# Patient Record
Sex: FEMALE | Race: BLACK OR AFRICAN AMERICAN | NOT HISPANIC OR LATINO | Employment: PART TIME | URBAN - METROPOLITAN AREA
[De-identification: names, ages, dates, MRNs, and addresses within clinical notes are randomized per-mention and may not be internally consistent; named-entity substitution may affect disease eponyms.]

---

## 2020-06-19 ENCOUNTER — TRANSCRIBE ORDERS (OUTPATIENT)
Dept: ADMINISTRATIVE | Age: 30
End: 2020-06-19

## 2020-06-19 ENCOUNTER — APPOINTMENT (OUTPATIENT)
Dept: RADIOLOGY | Age: 30
End: 2020-06-19

## 2020-06-19 DIAGNOSIS — Z02.1 PRE-EMPLOYMENT HEALTH SCREENING EXAMINATION: Primary | ICD-10-CM

## 2020-06-19 DIAGNOSIS — Z02.1 PRE-EMPLOYMENT HEALTH SCREENING EXAMINATION: ICD-10-CM

## 2020-06-19 PROCEDURE — 71045 X-RAY EXAM CHEST 1 VIEW: CPT

## 2021-01-12 ENCOUNTER — HOSPITAL ENCOUNTER (EMERGENCY)
Facility: HOSPITAL | Age: 31
Discharge: HOME | End: 2021-01-12
Attending: EMERGENCY MEDICINE

## 2021-01-12 VITALS
HEART RATE: 79 BPM | WEIGHT: 202 LBS | RESPIRATION RATE: 18 BRPM | SYSTOLIC BLOOD PRESSURE: 126 MMHG | TEMPERATURE: 97.7 F | DIASTOLIC BLOOD PRESSURE: 78 MMHG | OXYGEN SATURATION: 100 %

## 2021-01-12 DIAGNOSIS — N92.1 MENOMETRORRHAGIA: Primary | ICD-10-CM

## 2021-01-12 LAB
ALBUMIN SERPL BCP-MCNC: 3.9 G/DL (ref 3.5–5)
ALP SERPL-CCNC: 107 U/L (ref 46–116)
ALT SERPL W P-5'-P-CCNC: 14 U/L (ref 12–78)
ANION GAP SERPL CALCULATED.3IONS-SCNC: 8 MMOL/L (ref 4–13)
AST SERPL W P-5'-P-CCNC: 12 U/L (ref 5–45)
B-HCG SERPL-ACNC: <2 MIU/ML
BACTERIA UR QL AUTO: ABNORMAL /HPF
BASOPHILS # BLD AUTO: 0.03 THOUSANDS/ΜL (ref 0–0.1)
BASOPHILS NFR BLD AUTO: 1 % (ref 0–1)
BILIRUB SERPL-MCNC: 0.14 MG/DL (ref 0.2–1)
BILIRUB UR QL STRIP: NEGATIVE
BUN SERPL-MCNC: 12 MG/DL (ref 5–25)
CALCIUM SERPL-MCNC: 9.2 MG/DL (ref 8.3–10.1)
CHLORIDE SERPL-SCNC: 103 MMOL/L (ref 100–108)
CLARITY UR: CLEAR
CO2 SERPL-SCNC: 28 MMOL/L (ref 21–32)
COLOR UR: ABNORMAL
CREAT SERPL-MCNC: 0.86 MG/DL (ref 0.6–1.3)
EOSINOPHIL # BLD AUTO: 0.07 THOUSAND/ΜL (ref 0–0.61)
EOSINOPHIL NFR BLD AUTO: 1 % (ref 0–6)
ERYTHROCYTE [DISTWIDTH] IN BLOOD BY AUTOMATED COUNT: 15.7 % (ref 11.6–15.1)
EXT PREG TEST URINE: NEGATIVE
EXT. CONTROL ED NAV: NORMAL
GFR SERPL CREATININE-BSD FRML MDRD: 103 ML/MIN/1.73SQ M
GLUCOSE SERPL-MCNC: 86 MG/DL (ref 65–140)
GLUCOSE UR STRIP-MCNC: NEGATIVE MG/DL
HCT VFR BLD AUTO: 43.3 % (ref 34.8–46.1)
HGB BLD-MCNC: 13.8 G/DL (ref 11.5–15.4)
HGB UR QL STRIP.AUTO: ABNORMAL
HOLD SPECIMEN: NORMAL
IMM GRANULOCYTES # BLD AUTO: 0.01 THOUSAND/UL (ref 0–0.2)
IMM GRANULOCYTES NFR BLD AUTO: 0 % (ref 0–2)
KETONES UR STRIP-MCNC: NEGATIVE MG/DL
LEUKOCYTE ESTERASE UR QL STRIP: NEGATIVE
LYMPHOCYTES # BLD AUTO: 1.8 THOUSANDS/ΜL (ref 0.6–4.47)
LYMPHOCYTES NFR BLD AUTO: 29 % (ref 14–44)
MCH RBC QN AUTO: 26.7 PG (ref 26.8–34.3)
MCHC RBC AUTO-ENTMCNC: 31.9 G/DL (ref 31.4–37.4)
MCV RBC AUTO: 84 FL (ref 82–98)
MONOCYTES # BLD AUTO: 0.46 THOUSAND/ΜL (ref 0.17–1.22)
MONOCYTES NFR BLD AUTO: 7 % (ref 4–12)
NEUTROPHILS # BLD AUTO: 3.93 THOUSANDS/ΜL (ref 1.85–7.62)
NEUTS SEG NFR BLD AUTO: 62 % (ref 43–75)
NITRITE UR QL STRIP: NEGATIVE
NON-SQ EPI CELLS URNS QL MICRO: ABNORMAL /HPF
NRBC BLD AUTO-RTO: 0 /100 WBCS
PH UR STRIP.AUTO: 5.5 [PH] (ref 4.5–8)
PLATELET # BLD AUTO: 279 THOUSANDS/UL (ref 149–390)
PMV BLD AUTO: 10.7 FL (ref 8.9–12.7)
POTASSIUM SERPL-SCNC: 3.6 MMOL/L (ref 3.5–5.3)
PROT SERPL-MCNC: 8.1 G/DL (ref 6.4–8.2)
PROT UR STRIP-MCNC: NEGATIVE MG/DL
RBC # BLD AUTO: 5.17 MILLION/UL (ref 3.81–5.12)
RBC #/AREA URNS AUTO: ABNORMAL /HPF
SODIUM SERPL-SCNC: 139 MMOL/L (ref 136–145)
SP GR UR STRIP.AUTO: 1.02 (ref 1–1.03)
UROBILINOGEN UR QL STRIP.AUTO: 0.2 E.U./DL
WBC # BLD AUTO: 6.3 THOUSAND/UL (ref 4.31–10.16)
WBC #/AREA URNS AUTO: ABNORMAL /HPF

## 2021-01-12 PROCEDURE — 99283 EMERGENCY DEPT VISIT LOW MDM: CPT

## 2021-01-12 PROCEDURE — 99284 EMERGENCY DEPT VISIT MOD MDM: CPT | Performed by: EMERGENCY MEDICINE

## 2021-01-12 PROCEDURE — 87591 N.GONORRHOEAE DNA AMP PROB: CPT | Performed by: EMERGENCY MEDICINE

## 2021-01-12 PROCEDURE — 87491 CHLMYD TRACH DNA AMP PROBE: CPT | Performed by: EMERGENCY MEDICINE

## 2021-01-12 PROCEDURE — 84702 CHORIONIC GONADOTROPIN TEST: CPT | Performed by: EMERGENCY MEDICINE

## 2021-01-12 PROCEDURE — 36415 COLL VENOUS BLD VENIPUNCTURE: CPT

## 2021-01-12 PROCEDURE — 81001 URINALYSIS AUTO W/SCOPE: CPT

## 2021-01-12 PROCEDURE — 85025 COMPLETE CBC W/AUTO DIFF WBC: CPT | Performed by: EMERGENCY MEDICINE

## 2021-01-12 PROCEDURE — 81025 URINE PREGNANCY TEST: CPT | Performed by: EMERGENCY MEDICINE

## 2021-01-12 PROCEDURE — 80053 COMPREHEN METABOLIC PANEL: CPT | Performed by: EMERGENCY MEDICINE

## 2021-01-13 NOTE — ED PROVIDER NOTES
History  Chief Complaint   Patient presents with    Menstrual Problem     pt c/o menstrual cramps, heavy bleeding and "clots" for six days  68-year-old female comes in for evaluation of menstrual cramps and heavy bleeding for approximately 6 days  Patient states that she typically gets a normal cycle  without heavy bleeding and she has never had any clots before  Her last period December was normal   However approximately 6 days ago she started her cycle again and has had heavier bleeding than normal and noticed blood clots  Patient denies being pregnant however she is not on any birth control  Patient also has a history of 2 ectopic pregnancies  History provided by:  Patient   used: No    Vaginal Bleeding  Quality:  Clots and heavier than menses  Severity:  Moderate  Onset quality:  Gradual  Duration:  6 days  Timing:  Constant  Progression:  Worsening  Chronicity:  New  Menstrual history:  Regular  Possible pregnancy: Patient is unsure  Context: spontaneously    Ineffective treatments:  None tried  Associated symptoms: no abdominal pain, no back pain, no dysuria, no fatigue and no fever    Risk factors: hx of ectopic pregnancy and unprotected sex    Risk factors: no bleeding disorder, no ovarian torsion and no STD        None       History reviewed  No pertinent past medical history  History reviewed  No pertinent surgical history  History reviewed  No pertinent family history  I have reviewed and agree with the history as documented  E-Cigarette/Vaping     E-Cigarette/Vaping Substances     Social History     Tobacco Use    Smoking status: Never Smoker    Smokeless tobacco: Never Used   Substance Use Topics    Alcohol use: Never     Frequency: Never    Drug use: Never       Review of Systems   Constitutional: Negative for fatigue and fever  HENT: Negative for congestion and ear pain  Eyes: Negative for discharge and redness     Respiratory: Negative for apnea, cough, shortness of breath and wheezing  Cardiovascular: Negative for chest pain  Gastrointestinal: Negative for abdominal pain and diarrhea  Endocrine: Negative for cold intolerance and polydipsia  Genitourinary: Positive for vaginal bleeding  Negative for difficulty urinating, dysuria and hematuria  Musculoskeletal: Negative for arthralgias and back pain  Skin: Negative for color change and rash  Allergic/Immunologic: Negative for environmental allergies and immunocompromised state  Neurological: Negative for numbness and headaches  Hematological: Negative for adenopathy  Does not bruise/bleed easily  Psychiatric/Behavioral: Negative for agitation and behavioral problems  Physical Exam  Physical Exam  Vitals signs and nursing note reviewed  Constitutional:       Appearance: Normal appearance  She is well-developed  She is not toxic-appearing  HENT:      Head: Normocephalic and atraumatic  Right Ear: Tympanic membrane and external ear normal       Left Ear: Tympanic membrane and external ear normal       Nose: Nose normal  No nasal deformity or rhinorrhea  Mouth/Throat:      Dentition: Normal dentition  Pharynx: Uvula midline  Eyes:      General: Lids are normal          Right eye: No discharge  Left eye: No discharge  Conjunctiva/sclera: Conjunctivae normal       Pupils: Pupils are equal, round, and reactive to light  Neck:      Musculoskeletal: Normal range of motion and neck supple  Vascular: No carotid bruit or JVD  Trachea: Trachea normal    Cardiovascular:      Rate and Rhythm: Normal rate and regular rhythm  No extrasystoles are present  Chest Wall: PMI is not displaced  Pulses: Normal pulses  Pulmonary:      Effort: Pulmonary effort is normal  No accessory muscle usage or respiratory distress  Breath sounds: Normal breath sounds  No wheezing, rhonchi or rales     Abdominal:      General: Bowel sounds are normal  Palpations: Abdomen is soft  Abdomen is not rigid  There is no mass  Tenderness: There is no abdominal tenderness  There is no guarding or rebound  Musculoskeletal:      Right shoulder: She exhibits normal range of motion, no bony tenderness, no swelling and no deformity  Cervical back: Normal  She exhibits normal range of motion, no tenderness, no bony tenderness and no deformity  Lymphadenopathy:      Cervical: No cervical adenopathy  Skin:     General: Skin is warm and dry  Findings: No rash  Neurological:      Mental Status: She is alert and oriented to person, place, and time  GCS: GCS eye subscore is 4  GCS verbal subscore is 5  GCS motor subscore is 6  Cranial Nerves: No cranial nerve deficit  Sensory: No sensory deficit  Deep Tendon Reflexes: Reflexes are normal and symmetric     Psychiatric:         Speech: Speech normal          Behavior: Behavior normal          Vital Signs  ED Triage Vitals   Temperature Pulse Respirations Blood Pressure SpO2   01/12/21 1704 01/12/21 1704 01/12/21 1704 01/12/21 1704 01/12/21 1948   97 7 °F (36 5 °C) 95 18 134/58 100 %      Temp Source Heart Rate Source Patient Position - Orthostatic VS BP Location FiO2 (%)   01/12/21 1704 01/12/21 1704 01/12/21 1704 01/12/21 1704 --   Oral Monitor Sitting Right arm       Pain Score       --                  Vitals:    01/12/21 1704 01/12/21 1948   BP: 134/58 126/78   Pulse: 95 79   Patient Position - Orthostatic VS: Sitting Lying         Visual Acuity      ED Medications  Medications - No data to display    Diagnostic Studies  Results Reviewed     Procedure Component Value Units Date/Time    hCG, quantitative [[id number]]  (Normal) Collected: 01/12/21 1707    Lab Status: Final result Specimen: Blood from Arm, Right Updated: 01/12/21 1947     HCG, Quant <2 mIU/mL     Narrative:       Expected Ranges:     Approximate               Approximate HCG  Gestation age          Concentration ( mIU/mL)  _____________          ______________________   Maddie Sommers                      HCG values  0 2-1                       5-50  1-2                           2-3                         100-5000  3-4                         500-13515  4-5                         1000-20605  5-6                         [id number]  6-8                         [id number]  8-12                        12186-875326      Chlamydia/GC amplified DNA by PCR [[id number]] Collected: 01/12/21 1944    Lab Status:  In process Updated: 01/12/21 1944    Urine Microscopic [[id number]]  (Abnormal) Collected: 01/12/21 1918    Lab Status: Final result Specimen: Urine, Clean Catch Updated: 01/12/21 1939     RBC, UA 30-50 /hpf      WBC, UA None Seen /hpf      Epithelial Cells Occasional /hpf      Bacteria, UA Occasional /hpf     POCT pregnancy, urine [[id number]]  (Normal) Resulted: 01/12/21 1922    Lab Status: Final result Updated: 01/12/21 1922     EXT PREG TEST UR (Ref: Negative) negative     Control valid    Urine Macroscopic, POC [[id number]]  (Abnormal) Collected: 01/12/21 1918    Lab Status: Final result Specimen: Urine Updated: 01/12/21 1920     Color, UA Marlena     Clarity, UA Clear     pH, UA 5 5     Leukocytes, UA Negative     Nitrite, UA Negative     Protein, UA Negative mg/dl      Glucose, UA Negative mg/dl      Ketones, UA Negative mg/dl      Urobilinogen, UA 0 2 E U /dl      Bilirubin, UA Negative     Blood, UA Large     Specific Gravity, UA 1 025    Narrative:      CLINITEK RESULT    Comprehensive metabolic panel [[id number]]  (Abnormal) Collected: 01/12/21 1707    Lab Status: Final result Specimen: Blood from Arm, Right Updated: 01/12/21 1732     Sodium 139 mmol/L      Potassium 3 6 mmol/L      Chloride 103 mmol/L      CO2 28 mmol/L      ANION GAP 8 mmol/L      BUN 12 mg/dL      Creatinine 0 86 mg/dL      Glucose 86 mg/dL      Calcium 9 2 mg/dL      AST 12 U/L      ALT 14 U/L      Alkaline Phosphatase 107 U/L      Total Protein 8 1 g/dL      Albumin 3 9 g/dL      Total Bilirubin 0 14 mg/dL      eGFR 103 ml/min/1 73sq m     Narrative:      [place] guidelines for Chronic Kidney Disease (CKD):     Stage 1 with normal or high GFR (GFR > 90 mL/min/1 73 square meters)    Stage 2 Mild CKD (GFR = 60-89 mL/min/1 73 square meters)    Stage 3A Moderate CKD (GFR = 45-59 mL/min/1 73 square meters)    Stage 3B Moderate CKD (GFR = 30-44 mL/min/1 73 square meters)    Stage 4 Severe CKD (GFR = 15-29 mL/min/1 73 square meters)    Stage 5 End Stage CKD (GFR <15 mL/min/1 73 square meters)  Note: GFR calculation is accurate only with a steady state creatinine    CBC and differential [[id number]]  (Abnormal) Collected: 01/12/21 1707    Lab Status: Final result Specimen: Blood from Arm, Right Updated: 01/12/21 1715     WBC 6 30 Thousand/uL      RBC 5 17 Million/uL      Hemoglobin 13 8 g/dL      Hematocrit 43 3 %      MCV 84 fL      MCH 26 7 pg      MCHC 31 9 g/dL      RDW 15 7 %      MPV 10 7 fL      Platelets 850 Thousands/uL      nRBC 0 /100 WBCs      Neutrophils Relative 62 %      Immat GRANS % 0 %      Lymphocytes Relative 29 %      Monocytes Relative 7 %      Eosinophils Relative 1 %      Basophils Relative 1 %      Neutrophils Absolute 3 93 Thousands/µL      Immature Grans Absolute 0 01 Thousand/uL      Lymphocytes Absolute 1 80 Thousands/µL      Monocytes Absolute 0 46 Thousand/µL      Eosinophils Absolute 0 07 Thousand/µL      Basophils Absolute 0 03 Thousands/µL                  No orders to display              Procedures  Procedures         ED Course  ED Course as of Jan 12 2010 Tue Jan 12, 2021   1932 Discussed with patient that which she will need further workup with OB gyn including possibly different blood work and a pelvic ultrasound  This department does not do pelvic ultrasounds on stable non pregnant patients as per radiology policy  Patient has a normal hemoglobin and blood pressure    This would not be considered unstable  SBIRT 22yo+      Most Recent Value   SBIRT (24 yo +)   In order to provide better care to our patients, we are screening all of our patients for alcohol and drug use  Would it be okay to ask you these screening questions? Yes Filed at: 01/12/2021 1910   Initial Alcohol Screen: US AUDIT-C    1  How often do you have a drink containing alcohol?  0 Filed at: 01/12/2021 1910   2  How many drinks containing alcohol do you have on a typical day you are drinking? 0 Filed at: 01/12/2021 1910   3a  Male UNDER 65: How often do you have five or more drinks on one occasion? 0 Filed at: 01/12/2021 1910   3b  FEMALE Any Age, or MALE 65+: How often do you have 4 or more drinks on one occassion? 0 Filed at: 01/12/2021 1910   Audit-C Score  0 Filed at: 01/12/2021 1910   ESTHER: How many times in the past year have you    Used an illegal drug or used a prescription medication for non-medical reasons? Never Filed at: 01/12/2021 1910                    MDM  Number of Diagnoses or Management Options  Menometrorrhagia: new and requires workup     Amount and/or Complexity of Data Reviewed  Clinical lab tests: ordered and reviewed  Tests in the medicine section of CPT®: ordered and reviewed    Patient Progress  Patient progress: stable      Disposition  Final diagnoses:   Menometrorrhagia     Time reflects when diagnosis was documented in both MDM as applicable and the Disposition within this note     Time User Action Codes Description Comment    1/12/2021  7:30 PM Leslie Hutchison Add [N92 1] Menometrorrhagia       ED Disposition     ED Disposition Condition Date/Time Comment    Discharge Stable Tue Jan 12, 2021  7:30  [address] discharge to home/self care              Follow-up Information     Follow up With Specialties Details Why Contact Info Kala Sanders [facility] Schedule an appointment as soon as possible for a visit  Please call here or with your regular OB gyn will need further workup [address] [id number]  [address], [place], [place], [address] [phone]          There are no discharge medications for this patient  No discharge procedures on file      PDMP Review     None          ED Provider  Electronically Signed by           Marcella Gatica DO  01/12/21 2010       Marcella Gatica DO  01/12/21 2010

## 2021-01-13 NOTE — ED NOTES
Pt up and walking to the bathroom to provide a urine sample   No difficulties or distress noted at this time     Ciera Eller RN  01/12/21 8951

## 2021-01-17 LAB
C TRACH DNA SPEC QL NAA+PROBE: NEGATIVE
N GONORRHOEA DNA SPEC QL NAA+PROBE: NEGATIVE

## 2021-01-22 ENCOUNTER — OFFICE VISIT (OUTPATIENT)
Dept: OBGYN CLINIC | Facility: CLINIC | Age: 31
End: 2021-01-22

## 2021-01-22 VITALS
WEIGHT: 203 LBS | DIASTOLIC BLOOD PRESSURE: 65 MMHG | SYSTOLIC BLOOD PRESSURE: 102 MMHG | HEART RATE: 94 BPM | BODY MASS INDEX: 33.82 KG/M2 | HEIGHT: 65 IN

## 2021-01-22 DIAGNOSIS — N93.9 ABNORMAL UTERINE BLEEDING: Primary | ICD-10-CM

## 2021-01-22 PROCEDURE — 99213 OFFICE O/P EST LOW 20 MIN: CPT | Performed by: OBSTETRICS & GYNECOLOGY

## 2021-01-22 NOTE — PROGRESS NOTES
Assessment/Plan:     Diagnoses and all orders for this visit:    Abnormal uterine bleeding  -     TSH, 3rd generation with Free T4 reflex; Future  -     CBC and differential; Future  -     US pelvis complete w transvaginal; Future    Patient is a well appearing 26year old woman with new onset abnormal uterine bleeding  High likelihood of fibroids  Will check Pelvic U/S  Will also check CBC and TSH  Began discussing birth control options with patient and she is leaning towards Mirena IUD  Return in about 1 week (around 1/29/2021) for Review US and lab results   The patient indicates understanding of these issues and agrees with the plan  Subjective:      Patient ID: Jaziel Leon is a 26 y o  female  HPI   Patient who is a [id number] with a past medical history of 2 ectopic pregnancies in 2013 and 2016 reports that since January 6th, 2021 she has been experiencing heavy menstrual bleeding  Patient states that for roughly the first 10 days she was passing golf-ball sized blood clots and had to wear an adult diaper  She reports that over the past 3 days the bleeding has slowed down, but she continues to pass some quarter-size clots  Patient was seen in the [facility] ED on January 12th and lab work was done showing patient was not pregnant or anemic  Imaging studies were not done at this time  Patient reports that prior to this episode, her periods were normal  She states they had been coming every 28 to 30 days since the age of 13 and and lasted 4 days  Patient reports she has been experiencing mild, intermittent abdominal cramping over the past 2 weeks and also reports intermittent dizziness and lightheadedness with standing  Patient states she was on Depo-Provera a few years ago for birth control, but stopped this due to unwanted weight gain     Of note, patient's older sister who is 45 was recently diagnosed with Ovarian cancer     The following portions of the patient's history were reviewed and updated as appropriate: allergies, current medications, past family history, past medical history, past social history, past surgical history and problem list     Review of Systems      Objective:  /65 (BP Location: Right arm, Patient Position: Sitting, Cuff Size: Adult)   Pulse 94   Ht 5' 5" (1 651 m)   Wt 92 1 kg (203 lb)   LMP 01/06/2021   BMI 33 78 kg/m²        Physical Exam  Vitals signs reviewed  Constitutional:       General: She is not in acute distress  Appearance: Normal appearance  She is obese  She is not ill-appearing, toxic-appearing or diaphoretic  HENT:      Head: Normocephalic and atraumatic  Right Ear: External ear normal       Left Ear: External ear normal    Eyes:      General: No scleral icterus  Right eye: No discharge  Left eye: No discharge  Extraocular Movements: Extraocular movements intact  Conjunctiva/sclera: Conjunctivae normal    Neck:      Musculoskeletal: Normal range of motion and neck supple  Cardiovascular:      Rate and Rhythm: Normal rate and regular rhythm  Pulses: Normal pulses  Heart sounds: Normal heart sounds  No murmur  No friction rub  No gallop  Pulmonary:      Effort: Pulmonary effort is normal  No respiratory distress  Breath sounds: Normal breath sounds  No wheezing, rhonchi or rales  Abdominal:      General: Bowel sounds are normal  There is no distension  Palpations: Abdomen is soft  There is no mass  Tenderness: There is no abdominal tenderness  Skin:     General: Skin is warm and dry  Capillary Refill: Capillary refill takes less than 2 seconds  Neurological:      General: No focal deficit present  Mental Status: She is alert and oriented to person, place, and time     Psychiatric:         Mood and Affect: Mood normal          Behavior: Behavior normal

## 2021-06-24 ENCOUNTER — APPOINTMENT (OUTPATIENT)
Dept: RADIOLOGY | Facility: CLINIC | Age: 31
End: 2021-06-24

## 2021-06-24 ENCOUNTER — APPOINTMENT (OUTPATIENT)
Dept: URGENT CARE | Facility: CLINIC | Age: 31
End: 2021-06-24

## 2021-06-24 DIAGNOSIS — A15.0 TB (PULMONARY TUBERCULOSIS): ICD-10-CM

## 2021-06-24 DIAGNOSIS — A15.0 TB (PULMONARY TUBERCULOSIS): Primary | ICD-10-CM

## 2021-06-24 PROCEDURE — 71046 X-RAY EXAM CHEST 2 VIEWS: CPT

## 2021-09-08 ENCOUNTER — TELEPHONE (OUTPATIENT)
Dept: FAMILY MEDICINE CLINIC | Facility: CLINIC | Age: 31
End: 2021-09-08

## 2021-09-09 ENCOUNTER — OFFICE VISIT (OUTPATIENT)
Dept: FAMILY MEDICINE CLINIC | Facility: CLINIC | Age: 31
End: 2021-09-09

## 2021-09-09 VITALS
SYSTOLIC BLOOD PRESSURE: 110 MMHG | TEMPERATURE: 97.4 F | RESPIRATION RATE: 18 BRPM | DIASTOLIC BLOOD PRESSURE: 70 MMHG | HEART RATE: 90 BPM | OXYGEN SATURATION: 99 % | WEIGHT: 204 LBS | HEIGHT: 65 IN | BODY MASS INDEX: 33.99 KG/M2

## 2021-09-09 DIAGNOSIS — Z11.59 ENCOUNTER FOR HEPATITIS C SCREENING TEST FOR LOW RISK PATIENT: ICD-10-CM

## 2021-09-09 DIAGNOSIS — Z13.6 SCREENING FOR CARDIOVASCULAR CONDITION: ICD-10-CM

## 2021-09-09 DIAGNOSIS — Z00.00 ANNUAL PHYSICAL EXAM: Primary | ICD-10-CM

## 2021-09-09 DIAGNOSIS — Z11.4 SCREENING FOR HIV (HUMAN IMMUNODEFICIENCY VIRUS): ICD-10-CM

## 2021-09-09 PROCEDURE — 99395 PREV VISIT EST AGE 18-39: CPT | Performed by: FAMILY MEDICINE

## 2021-09-09 NOTE — ASSESSMENT & PLAN NOTE
- H/o ectopic pregnancy X2, menorrhagia  - Screening for cardiovascular disease: yearly BMP and Lipid panel ordered today   - Screening for colorectal cancer: no fam h/o   - Screening for cervical cancer: no fam h/o; last pap reported in 2019 which patient reports was normal  - H/o uterine cancer in sister diagnosed at age 45 approximately  - Screening for breast cancer: no family history  - Diabetic grandmother, no MI in family or strokes  - Vaccinations: unsure if UTD on Tdap, last pregnancy 7 years ago  - H/o BCG vaccine as a child, routine CXR screening done in June 2021 and read as normal  - Depression screening: negative 0   - Counseled the patient on healthy lifestyle habits

## 2021-09-09 NOTE — PATIENT INSTRUCTIONS

## 2021-09-09 NOTE — PROGRESS NOTES
[address] [facility]    NAME: Jamari Crenshaw  AGE: 33 y o  SEX: female  : 1990     DATE: 2021     Assessment and Plan:     Problem List Items Addressed This Visit        Other    Annual physical exam - Primary     - H/o ectopic pregnancy X2, menorrhagia  - Screening for cardiovascular disease: yearly BMP and Lipid panel ordered today   - Screening for colorectal cancer: no fam h/o   - Screening for cervical cancer: no fam h/o; last pap reported in 2019 which patient reports was normal  - H/o uterine cancer in sister diagnosed at age 45 approximately  - Screening for breast cancer: no family history  - Diabetic grandmother, no MI in family or strokes  - Vaccinations: unsure if UTD on Tdap, last pregnancy 7 years ago  - H/o BCG vaccine as a child, routine CXR screening done in 2021 and read as normal  - Depression screening: negative 0   - Counseled the patient on healthy lifestyle habits             Other Visit Diagnoses     Encounter for hepatitis C screening test for low risk patient        Relevant Orders    Hepatitis C antibody    Screening for HIV (human immunodeficiency virus)        Relevant Orders    HIV 1/2 Antigen/Antibody (4th Generation) w Reflex [facility]    Screening for cardiovascular condition        Relevant Orders    Lipid panel    BMI 33 0-33 9,adult              Immunizations and preventive care screenings were discussed with patient today  Appropriate education was printed on patient's after visit summary  Counseling:  Alcohol/drug use: discussed moderation in alcohol intake, the recommendations for healthy alcohol use, and avoidance of illicit drug use  Dental Health: discussed importance of regular tooth brushing, flossing, and dental visits    Injury prevention: discussed safety/seat belts, safety helmets, smoke detectors, carbon dioxide detectors, and smoking near bedding or upholstery  Sexual health: discussed sexually transmitted diseases, partner selection, use of condoms, avoidance of unintended pregnancy, and contraceptive alternatives  · Exercise: the importance of regular exercise/physical activity was discussed  Recommend exercise 3-5 times per week for at least 30 minutes  BMI Counseling: Body mass index is 33 95 kg/m²  The BMI is above normal  Nutrition recommendations include encouraging healthy choices of fruits and vegetables and moderation in carbohydrate intake  Exercise recommendations include moderate physical activity 150 minutes/week, exercising 3-5 times per week and obtaining a gym membership  No follow-ups on file  Chief Complaint:     Chief Complaint   Patient presents with    Physical Exam      History of Present Illness:     Adult Annual Physical   Patient here for a comprehensive physical exam  The patient reports no problems  Diet and Physical Activity  · Diet/Nutrition: limited junk food, limited fruits/vegetables and adequate whole grain intake  · Exercise: no formal exercise  Depression Screening  PHQ-9 Depression Screening    PHQ-9:   Frequency of the following problems over the past two weeks:      Little interest or pleasure in doing things: 0 - not at all  Feeling down, depressed, or hopeless: 0 - not at all  PHQ-2 Score: 0       General Health  · Sleep: gets 4-6 hours of sleep on average and snores loudly  · Hearing: normal - bilateral   · Vision: most recent eye exam >1 year ago and wears galsses while reading on computer  · Dental: no dental visits for >1 year, brushes teeth twice daily and flosses teeth occasionally  /GYN Health  · Last menstrual period: 09/03-09/07, no heavy vaginal bleeding during period  · Contraceptive method: no   · Sexually active with one male partner  · History of STDs?: no      Review of Systems:     Review of Systems   Constitutional: Negative  HENT: Negative      Eyes: Negative  Respiratory: Negative  Cardiovascular: Negative  Gastrointestinal: Negative  Genitourinary: Negative  Musculoskeletal: Negative  Neurological: Negative  Psychiatric/Behavioral: Negative  Past Medical History:     Past Medical History:   Diagnosis Date    Ectopic pregnancy 2013 and 2016     patient thinks she had a salpingectomy on one side and a salpingostomy on the other side       Past Surgical History:     History reviewed  No pertinent surgical history  Social History:     Social History     Socioeconomic History    Marital status: /Civil Union     Spouse name: None    Number of children: None    Years of education: None    Highest education level: None   Occupational History    None   Tobacco Use    Smoking status: Never Smoker    Smokeless tobacco: Never Used   Vaping Use    Vaping Use: Never used   Substance and Sexual Activity    Alcohol use: Never    Drug use: Never    Sexual activity: Yes     Partners: Male     Birth control/protection: None   Other Topics Concern    None   Social History Narrative    None     Social Determinants of Health     Financial Resource Strain: Low Risk     Difficulty of Paying Living Expenses: Not hard at all   Food Insecurity: No Food Insecurity    Worried About Running Out of Food in the Last Year: Never true    [place] of Food in the Last Year: Never true   Transportation Needs: No Transportation Needs    Lack of Transportation (Medical): No    Lack of Transportation (Non-Medical):  No   Physical Activity: Inactive    Days of Exercise per Week: 0 days    Minutes of Exercise per Session: 0 min   Stress: No Stress Concern Present    Feeling of Stress : Not at all   Social Connections:     Frequency of Communication with Friends and Family:     Frequency of Social Gatherings with Friends and Family:     Attends [employer] Services:     Active Member of Clubs or Organizations:     Attends Club or Organization Meetings:     Marital Status:    Intimate Partner Violence:     Fear of Current or Ex-Partner:     Emotionally Abused:     Physically Abused:     Sexually Abused:       Family History:     Family History   Problem Relation Age of Onset    Ovarian cancer Sister       Current Medications:     No current outpatient medications on file  No current facility-administered medications for this visit  Allergies:     No Known Allergies   Physical Exam:     /70 (BP Location: Left arm, Patient Position: Sitting, Cuff Size: Large)   Pulse 90   Temp (!) 97 4 °F (36 3 °C) (Temporal)   Resp 18   Ht 5' 5" (1 651 m)   Wt 92 5 kg (204 lb)   SpO2 99%   BMI 33 95 kg/m²     Physical Exam  Vitals reviewed  Constitutional:       Appearance: Normal appearance  HENT:      Head: Normocephalic and atraumatic  Neck:      Vascular: No carotid bruit  Cardiovascular:      Rate and Rhythm: Normal rate and regular rhythm  Pulses: Normal pulses  Heart sounds: Normal heart sounds  Pulmonary:      Effort: Pulmonary effort is normal       Breath sounds: Normal breath sounds  Abdominal:      General: Bowel sounds are normal       Palpations: Abdomen is soft  Musculoskeletal:         General: Normal range of motion  Cervical back: Normal range of motion and neck supple  Skin:     General: Skin is warm and dry  Neurological:      General: No focal deficit present  Mental Status: She is alert and oriented to person, place, and time  Mental status is at baseline     Psychiatric:         Mood and Affect: Mood normal          Behavior: Behavior normal           April Clyaton MD   [address]

## 2022-11-15 ENCOUNTER — OFFICE VISIT (OUTPATIENT)
Dept: FAMILY MEDICINE CLINIC | Facility: CLINIC | Age: 32
End: 2022-11-15

## 2022-11-15 VITALS
HEIGHT: 65 IN | OXYGEN SATURATION: 99 % | WEIGHT: 214 LBS | SYSTOLIC BLOOD PRESSURE: 115 MMHG | BODY MASS INDEX: 35.65 KG/M2 | DIASTOLIC BLOOD PRESSURE: 80 MMHG | RESPIRATION RATE: 18 BRPM | HEART RATE: 76 BPM | TEMPERATURE: 98 F

## 2022-11-15 DIAGNOSIS — Z12.4 CERVICAL CANCER SCREENING: ICD-10-CM

## 2022-11-15 DIAGNOSIS — Z00.00 ANNUAL PHYSICAL EXAM: Primary | ICD-10-CM

## 2022-11-15 DIAGNOSIS — Z11.59 NEED FOR HEPATITIS C SCREENING TEST: ICD-10-CM

## 2022-11-15 DIAGNOSIS — Z11.3 SCREENING FOR STD (SEXUALLY TRANSMITTED DISEASE): ICD-10-CM

## 2022-11-15 DIAGNOSIS — Z13.6 SCREENING FOR CARDIOVASCULAR CONDITION: ICD-10-CM

## 2022-11-15 NOTE — PATIENT INSTRUCTIONS

## 2022-11-15 NOTE — PROGRESS NOTES
[address] [facility]    NAME: Jean Carlos Crenshaw  AGE: 27 y o  SEX: female  : 1990     DATE: 11/15/2022     Assessment and Plan:     Problem List Items Addressed This Visit        Other    Annual physical exam - Primary     30yo female no significant PMH presents for new patient annual physical, no acute complaints  Family history significant for HTN and ovarian cancer  Pt is sexually active, perimenopausal  Pt would like STD testing  Social history unremarkable  PHQ 9 negative  Last pap/HPV cotesting  in [place], pt unsure of results and would like it repeated  VSS, physical exam unremarkable  Plan:  - STI testing: GC, HIV, RPR, Hep B  F/u results  - Pap/HPV obtained, f/u results  - Screening labs ordered: BMP, lipid panel, HCV antibody  F/u results  - F/u 1 year           Other Visit Diagnoses     Need for hepatitis C screening test        Relevant Orders    Hepatitis C antibody    Screening for STD (sexually transmitted disease)        Relevant Orders    HIV 1/2 Antigen/Antibody (4th Generation) w Reflex [facility]    Chlamydia/GC amplified DNA by PCR    RPR    Hepatitis B surface antigen    Cervical cancer screening        Relevant Orders    Liquid-based pap, screening    Screening for cardiovascular condition        Relevant Orders    Basic metabolic panel    Lipid Panel with Direct LDL reflex          Immunizations and preventive care screenings were discussed with patient today  Appropriate education was printed on patient's after visit summary  Counseling:  · Exercise: the importance of regular exercise/physical activity was discussed  Recommend exercise 3-5 times per week for at least 30 minutes  BMI Counseling: Body mass index is 35 83 kg/m²  The BMI is above normal  Exercise recommendations include exercising 3-5 times per week  BMI Counseling: Body mass index is 35 83 kg/m²   The BMI is above normal  Nutrition recommendations include consuming healthier snacks  Exercise recommendations include exercising 3-5 times per week  No pharmacotherapy was ordered  Rationale for BMI follow-up plan is due to patient being overweight or obese  Depression Screening and Follow-up Plan: Patient was screened for depression during today's encounter  They screened negative with a PHQ-2 score of 0  Return in about 1 year (around 11/15/2023)  Chief Complaint:     Chief Complaint   Patient presents with   • Physical Exam      History of Present Illness:     Adult Annual Physical   Patient here for a comprehensive physical exam  The patient reports no problems  Diet and Physical Activity  · Diet/Nutrition: unbalanced  · Exercise: no formal exercise  · Work: CNA     Depression Screening  PHQ-2/9 Depression Screening    Little interest or pleasure in doing things: 0 - not at all  Feeling down, depressed, or hopeless: 0 - not at all  PHQ-2 Score: 0  PHQ-2 Interpretation: Negative depression screen       General Health  · Sleep: gets 4-6 hours of sleep on average  · Hearing: normal - bilateral   · Vision: wears glasses  · Dental: last visit 2 months ago  /GYN Health  · Last menstrual period: 10/15/22, occurs every month, 4 days  · Contraceptive method: none  · Sexually active: yes, one partner    Preventative Screening:  · Last pap/HPV: 2019, pt unsure of results, pt would like to repeat pap  · Denies history of mammogram  · Denies history of colonoscopy     Review of Systems:     Review of Systems   Constitutional: Negative for chills and fever  HENT: Negative for congestion  Eyes: Negative for visual disturbance  Respiratory: Negative for shortness of breath  Cardiovascular: Negative for chest pain and palpitations  Gastrointestinal: Negative for abdominal pain  Allergic/Immunologic: Negative for environmental allergies and food allergies     Neurological: Negative for dizziness, weakness and headaches  Psychiatric/Behavioral: Negative for sleep disturbance  Past Medical History:     Past Medical History:   Diagnosis Date   • Ectopic pregnancy 2013 and 2016     patient thinks she had a salpingectomy on one side and a salpingostomy on the other side       Past Surgical History:     History reviewed  No pertinent surgical history  Social History:     Social History     Socioeconomic History   • Marital status: /Civil Union     Spouse name: None   • Number of children: None   • Years of education: None   • Highest education level: None   Occupational History   • None   Tobacco Use   • Smoking status: Never Smoker   • Smokeless tobacco: Never Used   Vaping Use   • Vaping Use: Never used   Substance and Sexual Activity   • Alcohol use: Never   • Drug use: Never   • Sexual activity: Yes     Partners: Male     Birth control/protection: None   Other Topics Concern   • None   Social History Narrative   • None     Social Determinants of Health     Financial Resource Strain: Low Risk    • Difficulty of Paying Living Expenses: Not hard at all   Food Insecurity: No Food Insecurity   • Worried About Running Out of Food in the Last Year: Never true   • Ran Out of Food in the Last Year: Never true   Transportation Needs: No Transportation Needs   • Lack of Transportation (Medical): No   • Lack of Transportation (Non-Medical):  No   Physical Activity: Sufficiently Active   • Days of Exercise per Week: 5 days   • Minutes of Exercise per Session: 150+ min   Stress: No Stress Concern Present   • Feeling of Stress : Not at all   Social Connections: Not on file   Intimate Partner Violence: Not At Risk   • Fear of Current or Ex-Partner: No   • Emotionally Abused: No   • Physically Abused: No   • Sexually Abused: No   Housing Stability: Low Risk    • Unable to Pay for Housing in the Last Year: No   • Number of Places Lived in the Last Year: 1   • Unstable Housing in the Last Year: No     Tobacco - denies history  Alcohol - denies  Illicit Drugs - denies     Family History:     Family History   Problem Relation Age of Onset   • Ovarian cancer Sister      Mom - HTN  Dad - CA (ovarian)     Current Medications:     No current outpatient medications on file  No current facility-administered medications for this visit  Allergies:     No Known Allergies      Physical Exam:     /80   Pulse 76   Temp 98 °F (36 7 °C)   Resp 18   Ht 5' 4 8" (1 646 m)   Wt 97 1 kg (214 lb)   SpO2 99%   BMI 35 83 kg/m²     Physical Exam  Exam conducted with a chaperone present  Constitutional:       Appearance: Normal appearance  HENT:      Head: Normocephalic and atraumatic  Cardiovascular:      Rate and Rhythm: Normal rate and regular rhythm  Heart sounds: Normal heart sounds  Pulmonary:      Effort: Pulmonary effort is normal       Breath sounds: Normal breath sounds  Abdominal:      Tenderness: There is no abdominal tenderness  Genitourinary:     Comments: Pelvic exam: No external lesions  Cervix visualized  Scant yellow discharge on cervical os  Bimanual exam unremarkable  Pt tolerated exam   Musculoskeletal:         General: No swelling or deformity  Lymphadenopathy:      Cervical: No cervical adenopathy  Skin:     General: Skin is warm and dry  Neurological:      Mental Status: She is alert     Psychiatric:         Mood and Affect: Mood normal          Behavior: Behavior normal           Sandra Espinal, DO   [address]

## 2022-11-16 ENCOUNTER — TELEPHONE (OUTPATIENT)
Dept: FAMILY MEDICINE CLINIC | Facility: CLINIC | Age: 32
End: 2022-11-16

## 2022-11-16 LAB
HPV HR 12 DNA CVX QL NAA+PROBE: NEGATIVE
HPV16 DNA CVX QL NAA+PROBE: NEGATIVE
HPV18 DNA CVX QL NAA+PROBE: NEGATIVE

## 2022-11-16 NOTE — ASSESSMENT & PLAN NOTE
30yo female no significant PMH presents for new patient annual physical, no acute complaints  Family history significant for HTN and ovarian cancer  Pt is sexually active, perimenopausal  Pt would like STD testing  Social history unremarkable  PHQ 9 negative  Last pap/HPV cotesting 2019 in [place], pt unsure of results and would like it repeated  VSS, physical exam unremarkable  Plan:  - STI testing: GC, HIV, RPR, Hep B  F/u results  - Pap/HPV obtained, f/u results  - Screening labs ordered: BMP, lipid panel, HCV antibody   F/u results  - F/u 1 year

## 2022-11-17 LAB
C TRACH DNA SPEC QL NAA+PROBE: NEGATIVE
N GONORRHOEA DNA SPEC QL NAA+PROBE: NEGATIVE

## 2022-11-18 ENCOUNTER — TELEPHONE (OUTPATIENT)
Dept: FAMILY MEDICINE CLINIC | Facility: CLINIC | Age: 32
End: 2022-11-18

## 2022-11-18 NOTE — TELEPHONE ENCOUNTER
Folder (To be completed by) - nurse     Name of Form - TB screening      Form to be Faxed (Fax #), Mailed (Address), or Picked up (By whom) -    Patient brought in today, form was filled out by nurse and given back to pt   Scanned copy into chart

## 2022-11-20 LAB
LAB AP GYN PRIMARY INTERPRETATION: NORMAL
Lab: NORMAL

## 2022-11-29 ENCOUNTER — OFFICE VISIT (OUTPATIENT)
Dept: FAMILY MEDICINE CLINIC | Facility: CLINIC | Age: 32
End: 2022-11-29

## 2022-11-29 ENCOUNTER — TELEPHONE (OUTPATIENT)
Dept: FAMILY MEDICINE CLINIC | Facility: CLINIC | Age: 32
End: 2022-11-29

## 2022-11-29 ENCOUNTER — APPOINTMENT (OUTPATIENT)
Dept: LAB | Facility: CLINIC | Age: 32
End: 2022-11-29

## 2022-11-29 VITALS
OXYGEN SATURATION: 97 % | BODY MASS INDEX: 37.52 KG/M2 | HEIGHT: 64 IN | TEMPERATURE: 97.6 F | SYSTOLIC BLOOD PRESSURE: 92 MMHG | WEIGHT: 219.8 LBS | HEART RATE: 64 BPM | DIASTOLIC BLOOD PRESSURE: 60 MMHG | RESPIRATION RATE: 20 BRPM

## 2022-11-29 DIAGNOSIS — Z00.00 ANNUAL PHYSICAL EXAM: Primary | ICD-10-CM

## 2022-11-29 DIAGNOSIS — Z11.1 SCREENING FOR TUBERCULOSIS: ICD-10-CM

## 2022-11-29 NOTE — TELEPHONE ENCOUNTER
Folder (To be completed by) - Dr Krystal Winslow      Name of Form - Physical Exam form/ TB for work    Color folder -Jennifer King    Form to be Faxed (Fax #), Mailed (Address), or Picked up (By whom) -    Give to pt at end of visit today

## 2022-11-29 NOTE — PROGRESS NOTES
[address] [facility]    NAME: Dain Crenshaw  AGE: 27 y o  SEX: female  : 1990     DATE: 2022     Assessment and Plan:     Problem List Items Addressed This Visit        Other    Annual physical exam - Primary     Patient presents for employment physical  no acute complaints  no significant PMH    - Screening for cardiovascular disease: yearly BMP and Lipid panel previously ordered   - No indication for colorectal and breast cancer screening due to patient's age and no family hx  -  Up to date on cervical Cancer screening  Normal Pap exam on 11/15/2022    - Counseled the patient on healthy lifestyle habits         Screening for tuberculosis     Ordered quantiferon TB test for employment purposes  Relevant Orders    Quantiferon TB Gold Plus       Immunizations and preventive care screenings were discussed with patient today  Appropriate education was printed on patient's after visit summary  Counseling:  · Exercise: the importance of regular exercise/physical activity was discussed  Recommend exercise 3-5 times per week for at least 30 minutes  BMI Counseling: Body mass index is 37 73 kg/m²  The BMI is above normal  Nutrition recommendations include decreasing portion sizes, encouraging healthy choices of fruits and vegetables and limiting drinks that contain sugar  Exercise recommendations include moderate physical activity 150 minutes/week  No pharmacotherapy was ordered  Rationale for BMI follow-up plan is due to patient being overweight or obese  Depression Screening and Follow-up Plan: Clincally patient does not have depression  No treatment is required  No follow-ups on file       Chief Complaint:     Chief Complaint   Patient presents with   • Employment Physical      History of Present Illness:     Adult Annual Physical   Patient here for a comprehensive physical exam  The patient reports no problems  Diet and Physical Activity  · Diet/Nutrition: poor diet  · Exercise: no formal exercise and moves around frequently for work  Depression Screening  PHQ-2/9 Depression Screening       Completed on 11/15/2022  General Health  · Sleep: sleeps well  · Hearing: normal - bilateral   · Vision: Wears glasses for reading  · Dental: Last visit 2 months ago  /GYN Health  · Contraceptive method: none  · Sexually active: yes, one partner  Preventative Screening:  · Last pap/HPV: 11/15/2022  Negative  · Denies history of mammogram   · Denies history of colonoscopy  Review of Systems:     Review of Systems   Constitutional: Negative for chills and fever  HENT: Negative for congestion, ear pain, hearing loss and sneezing  Eyes: Negative  Respiratory: Negative for cough, chest tightness and shortness of breath  Cardiovascular: Negative  Gastrointestinal: Negative for abdominal pain  Genitourinary: Negative  Musculoskeletal: Negative  Neurological: Negative for dizziness, weakness and headaches  Hematological: Negative  Psychiatric/Behavioral: Negative for dysphoric mood  Past Medical History:     Past Medical History:   Diagnosis Date   • Ectopic pregnancy 2013 and 2016     patient thinks she had a salpingectomy on one side and a salpingostomy on the other side       Past Surgical History:     History reviewed  No pertinent surgical history     Social History:     Social History     Socioeconomic History   • Marital status: /Civil Union     Spouse name: None   • Number of children: None   • Years of education: None   • Highest education level: None   Occupational History   • None   Tobacco Use   • Smoking status: Never   • Smokeless tobacco: Never   Vaping Use   • Vaping Use: Never used   Substance and Sexual Activity   • Alcohol use: Never   • Drug use: Never   • Sexual activity: Yes     Partners: Male     Birth control/protection: None   Other Topics Concern   • None   Social History Narrative   • None     Social Determinants of Health     Financial Resource Strain: Low Risk    • Difficulty of Paying Living Expenses: Not hard at all   Food Insecurity: No Food Insecurity   • Worried About Running Out of Food in the Last Year: Never true   • Ran Out of Food in the Last Year: Never true   Transportation Needs: No Transportation Needs   • Lack of Transportation (Medical): No   • Lack of Transportation (Non-Medical): No   Physical Activity: Sufficiently Active   • Days of Exercise per Week: 5 days   • Minutes of Exercise per Session: 150+ min   Stress: No Stress Concern Present   • Feeling of Stress : Not at all   Social Connections: Not on file   Intimate Partner Violence: Not At Risk   • Fear of Current or Ex-Partner: No   • Emotionally Abused: No   • Physically Abused: No   • Sexually Abused: No   Housing Stability: Low Risk    • Unable to Pay for Housing in the Last Year: No   • Number of Places Lived in the Last Year: 1   • Unstable Housing in the Last Year: No      Family History:     Family History   Problem Relation Age of Onset   • Ovarian cancer Sister       Current Medications:     No current outpatient medications on file  No current facility-administered medications for this visit  Allergies:     No Known Allergies   Physical Exam:     BP 92/60 (BP Location: Left arm, Patient Position: Sitting, Cuff Size: Large)   Pulse 64   Temp 97 6 °F (36 4 °C) (Temporal)   Resp 20   Ht 5' 4" (1 626 m)   Wt 99 7 kg (219 lb 12 8 oz)   SpO2 97%   BMI 37 73 kg/m²     Physical Exam  Constitutional:       Appearance: Normal appearance  She is obese  HENT:      Head: Normocephalic and atraumatic  Mouth/Throat:      Mouth: Mucous membranes are moist       Pharynx: Oropharynx is clear  Eyes:      Extraocular Movements: Extraocular movements intact  Pupils: Pupils are equal, round, and reactive to light     Cardiovascular: Rate and Rhythm: Normal rate and regular rhythm  Pulses: Normal pulses  Heart sounds: Normal heart sounds  Pulmonary:      Effort: Pulmonary effort is normal       Breath sounds: Normal breath sounds  Abdominal:      General: Bowel sounds are normal       Palpations: Abdomen is soft  Musculoskeletal:         General: Normal range of motion  Cervical back: Normal range of motion and neck supple  Skin:     General: Skin is warm and dry  Neurological:      General: No focal deficit present  Mental Status: She is alert     Psychiatric:         Mood and Affect: Mood normal          Behavior: Behavior normal           Keshawn Hall MD   [address]

## 2022-11-29 NOTE — ASSESSMENT & PLAN NOTE
Patient presents for employment physical  no acute complaints  no significant PMH    - Screening for cardiovascular disease: yearly BMP and Lipid panel previously ordered   - No indication for colorectal and breast cancer screening due to patient's age and no family hx  -  Up to date on cervical Cancer screening   Normal Pap exam on 11/15/2022    - Counseled the patient on healthy lifestyle habits

## 2022-11-29 NOTE — TELEPHONE ENCOUNTER
Patient had Quantiferon TB blood work ordered at appointment on 11/29/2022  Form will remain in green folder in clinical area until blood work results come back

## 2022-11-30 LAB
GAMMA INTERFERON BACKGROUND BLD IA-ACNC: 0.1 IU/ML
M TB IFN-G BLD-IMP: POSITIVE
M TB IFN-G CD4+ BCKGRND COR BLD-ACNC: 2.96 IU/ML
M TB IFN-G CD4+ BCKGRND COR BLD-ACNC: 3.25 IU/ML
MITOGEN IGNF BCKGRD COR BLD-ACNC: >10 IU/ML

## 2022-12-01 NOTE — TELEPHONE ENCOUNTER
Good Morning Dr Shara Juarez  Please review Quantiferon TB Gold Results and what is the next step for patient, let us know   Thanks

## 2022-12-06 DIAGNOSIS — Z11.1 SCREENING FOR TUBERCULOSIS: Primary | ICD-10-CM

## 2022-12-06 DIAGNOSIS — R76.12 POSITIVE QUANTIFERON-TB GOLD TEST: ICD-10-CM

## 2022-12-16 ENCOUNTER — APPOINTMENT (OUTPATIENT)
Dept: RADIOLOGY | Age: 32
End: 2022-12-16

## 2022-12-16 DIAGNOSIS — Z11.1 SCREENING FOR TUBERCULOSIS: ICD-10-CM

## 2022-12-16 DIAGNOSIS — R76.12 POSITIVE QUANTIFERON-TB GOLD TEST: ICD-10-CM

## 2022-12-23 ENCOUNTER — OFFICE VISIT (OUTPATIENT)
Dept: FAMILY MEDICINE CLINIC | Facility: CLINIC | Age: 32
End: 2022-12-23

## 2022-12-23 VITALS
DIASTOLIC BLOOD PRESSURE: 77 MMHG | HEART RATE: 88 BPM | BODY MASS INDEX: 35.46 KG/M2 | RESPIRATION RATE: 18 BRPM | TEMPERATURE: 97.1 F | WEIGHT: 206.6 LBS | OXYGEN SATURATION: 99 % | SYSTOLIC BLOOD PRESSURE: 108 MMHG

## 2022-12-23 DIAGNOSIS — Z22.7 TB LUNG, LATENT: Primary | ICD-10-CM

## 2022-12-23 RX ORDER — ISONIAZID 300 MG/1
900 TABLET ORAL WEEKLY
Qty: 36 TABLET | Refills: 0 | Status: SHIPPED | OUTPATIENT
Start: 2022-12-23 | End: 2023-03-11

## 2022-12-23 RX ORDER — LANOLIN ALCOHOL/MO/W.PET/CERES
50 CREAM (GRAM) TOPICAL WEEKLY
Qty: 12 TABLET | Refills: 0 | Status: SHIPPED | OUTPATIENT
Start: 2022-12-23 | End: 2023-01-04

## 2022-12-23 NOTE — ASSESSMENT & PLAN NOTE
- Patient with positive QuantiFERON TB test on 11/19/2022  Negative chest x-ray on 12/16  No acute cardiopulmonary disease  Patient currently asymptomatic  Denies any respiratory or systemic symptoms   -Of note, patient reports receiving  childhood vaccination which generally results in false positive TB test   Has been treated previously for latent TB   -Discussed with patient various treatment options for latent TB    Will treat with isoniazid plus rifapentine + vitamin B6  Once weekly for 3 months    -Pharmacy verified medication dose by weight-based

## 2022-12-23 NOTE — PROGRESS NOTES
Name: Sammy Guerrero      : 1990      MRN: [id number]  Encounter Provider: Neftaly Juarez MD  Encounter Date: 2022   Encounter department: [address] [place] [address]  TB lung, latent  Assessment & Plan:  - Patient with positive QuantiFERON TB test on 2022  Negative chest x-ray on   No acute cardiopulmonary disease  Patient currently asymptomatic  Denies any respiratory or systemic symptoms   -Of note, patient reports receiving  childhood vaccination which generally results in false positive TB test   Has been treated previously for latent TB   -Discussed with patient various treatment options for latent TB  Will treat with isoniazid plus rifapentine + vitamin B6  Once weekly for 3 months    -Pharmacy verified medication dose by weight-based      Orders:  -     isoniazid (NYDRAZID) 300 mg tablet; Take 3 tablets (900 mg total) by mouth once a week for 12 doses  -     Rifapentine 150 MG TABS; Take 6 tablets (900 mg total) by mouth once a week for 12 doses Please take medication weekly for 3 months  -     pyridoxine (VITAMIN B6) 50 mg tablet; Take 1 tablet (50 mg total) by mouth once a week for 12 days         Subjective      Presents to clinic today for follow-up of lab results  Patient was seen a month ago for work physical, and was noted with positive QuantiFERON TB test   Follow-up chest x-ray was negative  Patient today denies any respiratory or systemic symptoms  Patient reports getting ? BCG vaccine as a child, she was unsure of what vaccine or injection it was called but always has positive TB test, mostly TB skin test   He reports being treated for previous latent TB for a year  She denies any acute complaints or concerns today  Review of Systems   Constitutional: Negative for activity change, appetite change, chills and fever  Respiratory: Negative for cough, chest tightness and shortness of breath      Cardiovascular: Negative for chest pain and palpitations  Gastrointestinal: Negative for abdominal pain and vomiting  Skin: Negative for color change and rash  All other systems reviewed and are negative  No current outpatient medications on file prior to visit  Objective     /77   Pulse 88   Temp (!) 97 1 °F (36 2 °C)   Resp 18   Wt 93 7 kg (206 lb 9 6 oz)   SpO2 99%   BMI 35 46 kg/m²     Physical Exam  Vitals reviewed  Constitutional:       General: She is not in acute distress  Appearance: Normal appearance  She is not ill-appearing, toxic-appearing or diaphoretic  HENT:      Head: Normocephalic and atraumatic  Eyes:      General:         Right eye: No discharge  Left eye: No discharge  Conjunctiva/sclera: Conjunctivae normal    Cardiovascular:      Rate and Rhythm: Normal rate and regular rhythm  Pulses: Normal pulses  Heart sounds: Normal heart sounds  Pulmonary:      Effort: Pulmonary effort is normal  No respiratory distress  Breath sounds: Normal breath sounds  No wheezing  Abdominal:      General: Abdomen is flat  Bowel sounds are normal  There is no distension  Palpations: Abdomen is soft  Tenderness: There is no abdominal tenderness  There is no guarding  Musculoskeletal:         General: Normal range of motion  Right lower leg: No edema  Left lower leg: No edema  Skin:     General: Skin is warm and dry  Neurological:      Mental Status: She is alert  Mental status is at baseline  Psychiatric:         Mood and Affect: Mood normal          Behavior: Behavior normal          Thought Content:  Thought content normal          Judgment: Judgment normal        Amari Avina MD

## 2023-01-12 ENCOUNTER — APPOINTMENT (EMERGENCY)
Dept: CT IMAGING | Facility: HOSPITAL | Age: 33
End: 2023-01-12

## 2023-01-12 ENCOUNTER — HOSPITAL ENCOUNTER (EMERGENCY)
Facility: HOSPITAL | Age: 33
Discharge: HOME | End: 2023-01-12
Attending: EMERGENCY MEDICINE

## 2023-01-12 VITALS
SYSTOLIC BLOOD PRESSURE: 112 MMHG | TEMPERATURE: 98.7 F | HEART RATE: 78 BPM | DIASTOLIC BLOOD PRESSURE: 70 MMHG | OXYGEN SATURATION: 100 % | RESPIRATION RATE: 18 BRPM

## 2023-01-12 DIAGNOSIS — R10.9 ABDOMINAL PAIN: Primary | ICD-10-CM

## 2023-01-12 LAB
ALBUMIN SERPL BCP-MCNC: 4.3 G/DL (ref 3.5–5)
ALP SERPL-CCNC: 94 U/L (ref 34–104)
ALT SERPL W P-5'-P-CCNC: 10 U/L (ref 7–52)
ANION GAP SERPL CALCULATED.3IONS-SCNC: 7 MMOL/L (ref 4–13)
AST SERPL W P-5'-P-CCNC: 14 U/L (ref 13–39)
BACTERIA UR QL AUTO: ABNORMAL /HPF
BASOPHILS # BLD AUTO: 0.03 THOUSANDS/ÂΜL (ref 0–0.1)
BASOPHILS NFR BLD AUTO: 0 % (ref 0–1)
BILIRUB SERPL-MCNC: 0.22 MG/DL (ref 0.2–1)
BILIRUB UR QL STRIP: NEGATIVE
BUN SERPL-MCNC: 11 MG/DL (ref 5–25)
CALCIUM SERPL-MCNC: 9.1 MG/DL (ref 8.4–10.2)
CHLORIDE SERPL-SCNC: 107 MMOL/L (ref 96–108)
CLARITY UR: CLEAR
CO2 SERPL-SCNC: 24 MMOL/L (ref 21–32)
COLOR UR: ABNORMAL
CREAT SERPL-MCNC: 0.85 MG/DL (ref 0.6–1.3)
EOSINOPHIL # BLD AUTO: 0.09 THOUSAND/ÂΜL (ref 0–0.61)
EOSINOPHIL NFR BLD AUTO: 1 % (ref 0–6)
ERYTHROCYTE [DISTWIDTH] IN BLOOD BY AUTOMATED COUNT: 16.2 % (ref 11.6–15.1)
EXT PREGNANCY TEST URINE: NEGATIVE
EXT. CONTROL: NORMAL
GFR SERPL CREATININE-BSD FRML MDRD: 89 ML/MIN/1.73SQ M
GLUCOSE SERPL-MCNC: 103 MG/DL (ref 65–140)
GLUCOSE UR STRIP-MCNC: NEGATIVE MG/DL
HCT VFR BLD AUTO: 39 % (ref 34.8–46.1)
HGB BLD-MCNC: 12.3 G/DL (ref 11.5–15.4)
HGB UR QL STRIP.AUTO: ABNORMAL
IMM GRANULOCYTES # BLD AUTO: 0.02 THOUSAND/UL (ref 0–0.2)
IMM GRANULOCYTES NFR BLD AUTO: 0 % (ref 0–2)
KETONES UR STRIP-MCNC: NEGATIVE MG/DL
LEUKOCYTE ESTERASE UR QL STRIP: ABNORMAL
LIPASE SERPL-CCNC: 44 U/L (ref 11–82)
LYMPHOCYTES # BLD AUTO: 2.36 THOUSANDS/ÂΜL (ref 0.6–4.47)
LYMPHOCYTES NFR BLD AUTO: 33 % (ref 14–44)
MCH RBC QN AUTO: 25 PG (ref 26.8–34.3)
MCHC RBC AUTO-ENTMCNC: 31.5 G/DL (ref 31.4–37.4)
MCV RBC AUTO: 79 FL (ref 82–98)
MONOCYTES # BLD AUTO: 0.6 THOUSAND/ÂΜL (ref 0.17–1.22)
MONOCYTES NFR BLD AUTO: 8 % (ref 4–12)
MUCOUS THREADS UR QL AUTO: ABNORMAL
NEUTROPHILS # BLD AUTO: 4.08 THOUSANDS/ÂΜL (ref 1.85–7.62)
NEUTS SEG NFR BLD AUTO: 58 % (ref 43–75)
NITRITE UR QL STRIP: NEGATIVE
NON-SQ EPI CELLS URNS QL MICRO: ABNORMAL /HPF
NRBC BLD AUTO-RTO: 0 /100 WBCS
PH UR STRIP.AUTO: 5.5 [PH]
PLATELET # BLD AUTO: 284 THOUSANDS/UL (ref 149–390)
PMV BLD AUTO: 10.2 FL (ref 8.9–12.7)
POTASSIUM SERPL-SCNC: 3.8 MMOL/L (ref 3.5–5.3)
PROT SERPL-MCNC: 7.8 G/DL (ref 6.4–8.4)
PROT UR STRIP-MCNC: NEGATIVE MG/DL
RBC # BLD AUTO: 4.92 MILLION/UL (ref 3.81–5.12)
RBC #/AREA URNS AUTO: ABNORMAL /HPF
SODIUM SERPL-SCNC: 138 MMOL/L (ref 135–147)
SP GR UR STRIP.AUTO: 1.02 (ref 1–1.03)
UROBILINOGEN UR STRIP-ACNC: <2 MG/DL
WBC # BLD AUTO: 7.18 THOUSAND/UL (ref 4.31–10.16)
WBC #/AREA URNS AUTO: ABNORMAL /HPF

## 2023-01-12 RX ORDER — KETOROLAC TROMETHAMINE 30 MG/ML
15 INJECTION, SOLUTION INTRAMUSCULAR; INTRAVENOUS ONCE
Status: COMPLETED | OUTPATIENT
Start: 2023-01-12 | End: 2023-01-12

## 2023-01-12 RX ORDER — DICYCLOMINE HCL 20 MG
20 TABLET ORAL ONCE
Status: COMPLETED | OUTPATIENT
Start: 2023-01-12 | End: 2023-01-12

## 2023-01-12 RX ORDER — DOCUSATE SODIUM 100 MG/1
100 CAPSULE, LIQUID FILLED ORAL DAILY
Qty: 14 CAPSULE | Refills: 0 | Status: SHIPPED | OUTPATIENT
Start: 2023-01-12 | End: 2023-01-26

## 2023-01-12 RX ADMIN — IOHEXOL 100 ML: 350 INJECTION, SOLUTION INTRAVENOUS at 21:16

## 2023-01-12 RX ADMIN — DICYCLOMINE HYDROCHLORIDE 20 MG: 20 TABLET ORAL at 22:22

## 2023-01-12 RX ADMIN — KETOROLAC TROMETHAMINE 15 MG: 30 INJECTION, SOLUTION INTRAMUSCULAR; INTRAVENOUS at 22:22

## 2023-01-12 RX ADMIN — MAGNESIUM HYDROXIDE 30 ML: 400 SUSPENSION ORAL at 22:22

## 2023-01-13 NOTE — ED PROVIDER NOTES
History  Chief Complaint   Patient presents with   • Abdominal Pain     Pt reports abd cramping x1 week, hurting to have a bowel movement and bleeding from straining     The patient is a 69-year-old female with a history of two ectopic pregnancies in 2013 and 2016, who presents for evaluation of abdominal pain  She reports one week of generalized abdominal cramping  The pain waxes and wanes, but she has not found any specific triggers  Associated symptoms include constipation  States last BM was this morning, however she had to exert a lot of effort and her stool was hard  No blood noted in the stool  In addition to the cramping she also reports several episodes of vaginal bleeding, which she notices when she wipes  No blood noted in her underwear  Denies vaginal discharge, nausea, vomiting, diarrhea, chest pain, SOB, back pain,           Prior to Admission Medications   Prescriptions Last Dose Informant Patient Reported? Taking? Rifapentine 150 MG TABS   No No   Sig: Take 6 tablets (900 mg total) by mouth once a week for 12 doses Please take medication weekly for 3 months   isoniazid (NYDRAZID) 300 mg tablet   No No   Sig: Take 3 tablets (900 mg total) by mouth once a week for 12 doses   pyridoxine (VITAMIN B6) 50 mg tablet   No No   Sig: Take 1 tablet (50 mg total) by mouth once a week for 12 days      Facility-Administered Medications: None       Past Medical History:   Diagnosis Date   • Ectopic pregnancy 2013 and 2016     patient thinks she had a salpingectomy on one side and a salpingostomy on the other side        History reviewed  No pertinent surgical history  Family History   Problem Relation Age of Onset   • Ovarian cancer Sister      I have reviewed and agree with the history as documented      E-Cigarette/Vaping   • E-Cigarette Use Never User      E-Cigarette/Vaping Substances     Social History     Tobacco Use   • Smoking status: Never   • Smokeless tobacco: Never   Vaping Use   • Vaping Use: Never used   Substance Use Topics   • Alcohol use: Never   • Drug use: Never       Review of Systems   Constitutional: Negative for chills and fever  HENT: Negative for ear pain and sore throat  Eyes: Negative for pain and visual disturbance  Respiratory: Negative for cough and shortness of breath  Cardiovascular: Negative for chest pain and palpitations  Gastrointestinal: Positive for abdominal pain and constipation  Negative for abdominal distention, anal bleeding, blood in stool, diarrhea, nausea, rectal pain and vomiting  Genitourinary: Positive for vaginal bleeding  Negative for difficulty urinating, dysuria, flank pain, frequency, hematuria, menstrual problem, pelvic pain, urgency, vaginal discharge and vaginal pain  Musculoskeletal: Negative for arthralgias and back pain  Skin: Negative for color change and rash  Neurological: Negative for seizures and syncope  All other systems reviewed and are negative  Physical Exam  Physical Exam  Vitals and nursing note reviewed  Constitutional:       General: She is awake  Appearance: She is well-developed and overweight  She is not ill-appearing or diaphoretic  HENT:      Head: Normocephalic and atraumatic  Right Ear: External ear normal       Left Ear: External ear normal       Nose: Nose normal       Mouth/Throat:      Lips: Pink  No lesions  Mouth: Mucous membranes are moist    Eyes:      General: Lids are normal  Gaze aligned appropriately  No scleral icterus  Conjunctiva/sclera: Conjunctivae normal       Pupils: Pupils are equal, round, and reactive to light  Cardiovascular:      Rate and Rhythm: Normal rate and regular rhythm  Heart sounds: S1 normal and S2 normal  No murmur heard  No friction rub  No gallop  Pulmonary:      Effort: Pulmonary effort is normal  No respiratory distress  Breath sounds: Normal breath sounds and air entry  No wheezing, rhonchi or rales     Abdominal:      General: Abdomen is flat  Bowel sounds are normal       Palpations: Abdomen is soft  There is no mass  Tenderness: There is abdominal tenderness in the suprapubic area  There is rebound  There is no guarding  Musculoskeletal:      Cervical back: Normal, full passive range of motion without pain and neck supple  No rigidity or crepitus  No spinous process tenderness or muscular tenderness  Thoracic back: Normal  No tenderness or bony tenderness  Lumbar back: Normal  No tenderness or bony tenderness  Skin:     General: Skin is warm and dry  Capillary Refill: Capillary refill takes less than 2 seconds  Coloration: Skin is not cyanotic, jaundiced or pale  Findings: No erythema, lesion, petechiae or rash  Neurological:      Mental Status: She is alert and oriented to person, place, and time  Psychiatric:         Attention and Perception: Attention normal          Speech: Speech normal          Behavior: Behavior is cooperative           Vital Signs  ED Triage Vitals [01/12/23 2007]   Temperature Pulse Respirations Blood Pressure SpO2   98 7 °F (37 1 °C) 84 18 125/80 100 %      Temp Source Heart Rate Source Patient Position - Orthostatic VS BP Location FiO2 (%)   Oral Monitor Sitting Right arm --      Pain Score       5           Vitals:    01/12/23 2007   BP: 125/80   Pulse: 84   Patient Position - Orthostatic VS: Sitting       ED Medications  Medications - No data to display    Diagnostic Studies  Results Reviewed     Procedure Component Value Units Date/Time    UA w Reflex to Microscopic w Reflex to Culture [[id number]]     Lab Status: No result Specimen: Urine     POCT pregnancy, urine [[id number]]     Lab Status: No result Specimen: Urine     CBC and differential [[id number]]     Lab Status: No result Specimen: Blood     Comprehensive metabolic panel [[id number]]     Lab Status: No result Specimen: Blood     Lipase [[id number]]     Lab Status: No result Specimen: Blood                  No orders to display              Procedures  Procedures         ED Course  ED Course as of 01/12/23 2221   u Jan 12, 2023 2106 Lipase: 44   2107 Comprehensive metabolic panel  No electrolyte abnormalities  2147 IMPRESSION:  1  No bowel obstruction or evidence of acute inflammatory process in the abdomen or pelvis  2  Findings above suggestive of decreased intestinal transit  Correlate clinically and consider GI follow-up                                             MDM    Disposition  Final diagnoses:   None     ED Disposition     None      Follow-up Information    None         Patient's Medications   Discharge Prescriptions    No medications on file       No discharge procedures on file      PDMP Review     None          ED Provider  Electronically Signed by Thousand/µL      Basophils Absolute 0 03 Thousands/µL     Urine Microscopic [[id number]]  (Abnormal) Collected: 01/12/23 2017    Lab Status: Final result Specimen: Urine, Clean Catch Updated: 01/12/23 2034     RBC, UA 1-2 /hpf      WBC, UA 1-2 /hpf      Epithelial Cells Occasional /hpf      Bacteria, UA None Seen /hpf      MUCUS THREADS Occasional    UA w Reflex to Microscopic w Reflex to Culture [[id number]]  (Abnormal) Collected: 01/12/23 2017    Lab Status: Final result Specimen: Urine, Clean Catch Updated: 01/12/23 2034     Color, UA Light Yellow     Clarity, UA Clear     Specific Gravity, UA 1 024     pH, UA 5 5     Leukocytes, UA Moderate     Nitrite, UA Negative     Protein, UA Negative mg/dl      Glucose, UA Negative mg/dl      Ketones, UA Negative mg/dl      Urobilinogen, UA <2 0 mg/dl      Bilirubin, UA Negative     Occult Blood, UA Small    POCT pregnancy, urine [[id number]]  (Normal) Resulted: 01/12/23 2018    Lab Status: Final result Specimen: Urine Updated: 01/12/23 2018     EXT Preg Test, Ur Negative     Control Valid                 CT abdomen pelvis with contrast   Final Result by Davian Holder MD (01/12 2143)      No bowel obstruction or evidence of acute inflammatory process in the abdomen or pelvis  Findings above suggestive of decreased intestinal transit  Correlate clinically and consider GI follow-up  Workstation performed: [id number]                    Procedures  Procedures         ED Course  ED Course as of 01/20/23 1322   Thu Jan 12, 2023 2106 Lipase: 44   2107 Comprehensive metabolic panel  No electrolyte abnormalities  2147 IMPRESSION:  1  No bowel obstruction or evidence of acute inflammatory process in the abdomen or pelvis  2  Findings above suggestive of decreased intestinal transit    Correlate clinically and consider GI follow-up       SBIRT 20yo+    Flowsheet Row Most Recent Value   SBIRT (25 yo +)    In order to provide better care to our patients, we are screening all of our patients for alcohol and drug use  Would it be okay to ask you these screening questions? Yes Filed at: 01/12/2023 2043   Initial Alcohol Screen: US AUDIT-C     1  How often do you have a drink containing alcohol? 0 Filed at: 01/12/2023 2043   2  How many drinks containing alcohol do you have on a typical day you are drinking? 0 Filed at: 01/12/2023 2043   3b  FEMALE Any Age, or MALE 65+: How often do you have 4 or more drinks on one occassion? 0 Filed at: 01/12/2023 2043   Audit-C Score 0 Filed at: 01/12/2023 2043   ESTHER: How many times in the past year have you    Used an illegal drug or used a prescription medication for non-medical reasons? Never Filed at: 01/12/2023 2043          Medical Decision Making  Patient presents for abdominal pain x 1 week  She is afebrile and nontoxic appearing  On exam she has suprapubic tenderness with rebound  The abdomen is soft and non-distended without masses palpated  Differential diagnosis includes but is not limited to constipation, enteritis, colitis, ileus, intussusception, volvulus, bowel obstruction, gastroparesis, pelvic pathology, or UTI  CBC, CMP, lipase, and urine microscopic were all unremarkable  CT showed mild to moderate fecal retention with fecalization of multiple small bowel loops  Reviewed all the results with the patient and all her questions were answered  Patient report relief with toradol and was given milk of magnesia and a prescription for stool softeners  Strict return precautions discussed and she verbalized understanding  Follow up with PCP  Abdominal pain: acute illness or injury  Amount and/or Complexity of Data Reviewed  Labs: ordered  Decision-making details documented in ED Course  Radiology: ordered  Risk  OTC drugs  Prescription drug management            Disposition  Final diagnoses:   Abdominal pain     Time reflects when diagnosis was documented in both MDM as applicable and the Disposition within this note Time User Action Codes Description Comment    1/12/2023 10:45 PM [facility] Add [R10 9] Abdominal pain       ED Disposition     ED Disposition   Discharge    Condition   Stable    Date/Time   Thu Jan 12, 2023 10:45 PM    Comment   [address] discharge to home/self care  Follow-up Information    None         Discharge Medication List as of 1/12/2023 10:48 PM      START taking these medications    Details   docusate sodium (COLACE) 100 mg capsule Take 1 capsule (100 mg total) by mouth daily for 14 days, Starting Thu 1/12/2023, Until Thu 1/26/2023, Normal         CONTINUE these medications which have NOT CHANGED    Details   isoniazid (NYDRAZID) 300 mg tablet Take 3 tablets (900 mg total) by mouth once a week for 12 doses, Starting Fri 12/23/2022, Until Sat 3/11/2023, Normal      pyridoxine (VITAMIN B6) 50 mg tablet Take 1 tablet (50 mg total) by mouth once a week for 12 days, Starting Fri 12/23/2022, Until Wed 1/4/2023, Normal      Rifapentine 150 MG TABS Take 6 tablets (900 mg total) by mouth once a week for 12 doses Please take medication weekly for 3 months, Starting Fri 12/23/2022, Until Sat 3/11/2023, Normal             No discharge procedures on file      PDMP Review     None          ED Provider  Electronically Signed by           Nida Wilson PA-C  01/20/23 1218

## 2023-01-28 PROBLEM — Z11.1 SCREENING FOR TUBERCULOSIS: Status: RESOLVED | Noted: 2022-11-29 | Resolved: 2023-01-28

## 2023-04-04 DIAGNOSIS — Z22.7 TB LUNG, LATENT: ICD-10-CM

## 2023-04-04 RX ORDER — PYRIDOXINE HCL (VITAMIN B6) 50 MG
TABLET ORAL
Qty: 12 TABLET | Refills: 0 | Status: SHIPPED | OUTPATIENT
Start: 2023-04-04

## 2023-08-08 ENCOUNTER — HOSPITAL ENCOUNTER (EMERGENCY)
Facility: HOSPITAL | Age: 33
Discharge: HOME | End: 2023-08-08
Attending: EMERGENCY MEDICINE
Payer: COMMERCIAL

## 2023-08-08 VITALS
HEART RATE: 93 BPM | TEMPERATURE: 98.4 F | RESPIRATION RATE: 16 BRPM | SYSTOLIC BLOOD PRESSURE: 114 MMHG | OXYGEN SATURATION: 100 % | DIASTOLIC BLOOD PRESSURE: 64 MMHG

## 2023-08-08 DIAGNOSIS — N93.8 DYSFUNCTIONAL UTERINE BLEEDING: Primary | ICD-10-CM

## 2023-08-08 DIAGNOSIS — N93.9 VAGINAL BLEEDING: ICD-10-CM

## 2023-08-08 LAB
BILIRUB UR QL STRIP: NEGATIVE
CLARITY UR: CLEAR
COLOR UR: NORMAL
ERYTHROCYTE [DISTWIDTH] IN BLOOD BY AUTOMATED COUNT: 17 % (ref 11.6–15.1)
EXT PREGNANCY TEST URINE: NEGATIVE
EXT. CONTROL: NORMAL
GLUCOSE UR STRIP-MCNC: NEGATIVE MG/DL
HCT VFR BLD AUTO: 38.2 % (ref 34.8–46.1)
HGB BLD-MCNC: 12.2 G/DL (ref 11.5–15.4)
HGB UR QL STRIP.AUTO: NEGATIVE
KETONES UR STRIP-MCNC: NEGATIVE MG/DL
LEUKOCYTE ESTERASE UR QL STRIP: NEGATIVE
MCH RBC QN AUTO: 25.1 PG (ref 26.8–34.3)
MCHC RBC AUTO-ENTMCNC: 31.9 G/DL (ref 31.4–37.4)
MCV RBC AUTO: 78 FL (ref 82–98)
NITRITE UR QL STRIP: NEGATIVE
PH UR STRIP.AUTO: 5.5 [PH]
PLATELET # BLD AUTO: 292 THOUSANDS/UL (ref 149–390)
PMV BLD AUTO: 10.4 FL (ref 8.9–12.7)
PROT UR STRIP-MCNC: NEGATIVE MG/DL
RBC # BLD AUTO: 4.87 MILLION/UL (ref 3.81–5.12)
SP GR UR STRIP.AUTO: 1.01 (ref 1–1.03)
UROBILINOGEN UR STRIP-ACNC: <2 MG/DL
WBC # BLD AUTO: 7.33 THOUSAND/UL (ref 4.31–10.16)

## 2023-08-08 PROCEDURE — 81025 URINE PREGNANCY TEST: CPT | Performed by: EMERGENCY MEDICINE

## 2023-08-08 PROCEDURE — 81003 URINALYSIS AUTO W/O SCOPE: CPT | Performed by: EMERGENCY MEDICINE

## 2023-08-08 PROCEDURE — 36415 COLL VENOUS BLD VENIPUNCTURE: CPT | Performed by: EMERGENCY MEDICINE

## 2023-08-08 PROCEDURE — 87591 N.GONORRHOEAE DNA AMP PROB: CPT | Performed by: EMERGENCY MEDICINE

## 2023-08-08 PROCEDURE — 87491 CHLMYD TRACH DNA AMP PROBE: CPT | Performed by: EMERGENCY MEDICINE

## 2023-08-08 PROCEDURE — 99283 EMERGENCY DEPT VISIT LOW MDM: CPT

## 2023-08-08 PROCEDURE — 85027 COMPLETE CBC AUTOMATED: CPT | Performed by: EMERGENCY MEDICINE

## 2023-08-08 NOTE — ED PROVIDER NOTES
History  Chief Complaint   Patient presents with   • Vaginal Bleeding     Pt reports long menstruation cycles, had it first week of this month and reports still bleeding, denies heavy bleeding, lower abd cramping     23-year-old female with past medical history of 2 prior ectopic pregnancies (2013 and 2017) presenting to the ED complaining of mild vaginal bleeding (cherry red in color) with associated lower abdominal cramping and intermittent lightheadedness for the past 4 days. Last known menstrual period was August 1, patient states that her periods usually last 4 days with normal amount of bleeding however this time and cramping and bleeding have persisted. Patient reports that bleeding volume is minimal, stating that it is spotting when she wipes but not soaking through pads or tampons compared to her normal.  She has been taking ibuprofen twice a day with some relief. Patient is sexually active with 1 long-term partner, does not believe she is pregnant, is not worried about STDs. She denies any dysuria, hematuria, increased urinary frequency, back pain, flank pain or prior history of kidney stones. Patient denies any injury or trauma or foreign body insertion. Bleeding is unchanged with sex. Patient used to be on OCPs years ago and reports no bleeding with her periods at that time, is not currently on any OCP or other IUD/hormonal control. Denies fevers, chills, headache, syncope, chest pain, shortness of breath, nausea, vomiting, diarrhea, constipation, bloody/tarry stools. Patient denies any antiplatelet/anticoagulant use including aspirin. Non-smoker, no alcohol use, no illicit drug use. Patient states that she does not have regular OB follow-up. Prior to Admission Medications   Prescriptions Last Dose Informant Patient Reported? Taking?    Priftin 150 MG TABS   No No   Sig: TAKE 6 TABLETS (900 MG TOTAL) BY MOUTH ONCE A WEEK FOR 12 DOSES PLEASE TAKE MEDICATION WEEKLY FOR 3 MONTHS docusate sodium (COLACE) 100 mg capsule   No No   Sig: Take 1 capsule (100 mg total) by mouth daily for 14 days   isoniazid (NYDRAZID) 300 mg tablet   No No   Sig: Take 3 tablets (900 mg total) by mouth once a week for 12 doses   pyridoxine (VITAMIN B6) 50 mg tablet   No No   Sig: TAKE 1 TABLET (50 MG TOTAL) BY MOUTH ONCE A WEEK FOR 12 DAYS      Facility-Administered Medications: None       Past Medical History:   Diagnosis Date   • Ectopic pregnancy 2013 and 2016     patient thinks she had a salpingectomy on one side and a salpingostomy on the other side        History reviewed. No pertinent surgical history. Family History   Problem Relation Age of Onset   • Ovarian cancer Sister      I have reviewed and agree with the history as documented. E-Cigarette/Vaping   • E-Cigarette Use Never User      E-Cigarette/Vaping Substances     Social History     Tobacco Use   • Smoking status: Never   • Smokeless tobacco: Never   Vaping Use   • Vaping Use: Never used   Substance Use Topics   • Alcohol use: Never   • Drug use: Never        Review of Systems   Constitutional: Negative for chills and fever. HENT: Negative for congestion, ear pain, nosebleeds and sore throat. Eyes: Negative for pain and visual disturbance. Respiratory: Negative for cough and shortness of breath. Cardiovascular: Negative for chest pain and palpitations. Gastrointestinal: Negative for abdominal pain, constipation, diarrhea, nausea and vomiting. Genitourinary: Positive for menstrual problem, pelvic pain, vaginal bleeding and vaginal discharge. Negative for difficulty urinating, dysuria, frequency and hematuria. Musculoskeletal: Negative for arthralgias and back pain. Skin: Negative for color change and rash. Neurological: Positive for light-headedness. Negative for dizziness, tremors, seizures, syncope, facial asymmetry, speech difficulty, numbness and headaches. All other systems reviewed and are negative.       Physical Exam  ED Triage Vitals   Temperature Pulse Respirations Blood Pressure SpO2   08/08/23 1827 08/08/23 1827 08/08/23 1827 08/08/23 1829 08/08/23 1827   98.4 °F (36.9 °C) 93 16 114/64 100 %      Temp Source Heart Rate Source Patient Position - Orthostatic VS BP Location FiO2 (%)   08/08/23 1827 08/08/23 1827 -- 08/08/23 1829 --   Oral Monitor  Right arm       Pain Score       08/08/23 1827       No Pain             Orthostatic Vital Signs  Vitals:    08/08/23 1827 08/08/23 1829   BP:  114/64   Pulse: 93        Physical Exam  Vitals and nursing note reviewed. Exam conducted with a chaperone present (Robson Antonio (medical student)). Constitutional:       General: She is not in acute distress. Appearance: She is normal weight. She is not ill-appearing or toxic-appearing. HENT:      Head: Normocephalic and atraumatic. Right Ear: External ear normal.      Left Ear: External ear normal.      Nose: Nose normal.      Mouth/Throat:      Mouth: Mucous membranes are moist.      Pharynx: Oropharynx is clear. Eyes:      General: No scleral icterus. Extraocular Movements: Extraocular movements intact. Pupils: Pupils are equal, round, and reactive to light. Cardiovascular:      Rate and Rhythm: Normal rate and regular rhythm. Pulses: Normal pulses. Heart sounds: Normal heart sounds. No murmur heard. No friction rub. No gallop. Pulmonary:      Effort: Pulmonary effort is normal. No respiratory distress. Breath sounds: Normal breath sounds. No wheezing, rhonchi or rales. Abdominal:      General: Bowel sounds are normal.      Palpations: Abdomen is soft. Tenderness: There is abdominal tenderness in the suprapubic area. There is no right CVA tenderness, left CVA tenderness, guarding or rebound. Negative signs include Mathur's sign and McBurney's sign. Genitourinary:     General: Normal vulva. Exam position: Lithotomy position. Pubic Area: No rash.        Labia: Right: No rash, tenderness or lesion. Left: No rash, tenderness or lesion. Vagina: Normal. No foreign body. No vaginal discharge, erythema or bleeding. Cervix: No discharge, lesion, erythema or cervical bleeding. Comments: External genitalia appear normal without evidence of trauma, rash, lacerations, discharge or active bleeding. Cervix visualized without punctate lesions, Cervical os closed. No discharge, lacerations, clots, signs of trauma or active bleeding to the cervix or vaginal canal.   Musculoskeletal:         General: No tenderness. Normal range of motion. Cervical back: Normal range of motion and neck supple. No rigidity or tenderness. Right lower leg: No edema. Skin:     General: Skin is warm and dry. Capillary Refill: Capillary refill takes less than 2 seconds. Coloration: Skin is not jaundiced or pale. Findings: No rash. Neurological:      General: No focal deficit present. Mental Status: She is alert and oriented to person, place, and time. Mental status is at baseline. Psychiatric:         Mood and Affect: Mood normal.         Behavior: Behavior normal.         Thought Content:  Thought content normal.         Judgment: Judgment normal.         ED Medications  Medications - No data to display    Diagnostic Studies  Results Reviewed     Procedure Component Value Units Date/Time    UA w Reflex to Microscopic w Reflex to Culture [[id number]] Collected: 08/08/23 2050    Lab Status: Final result Specimen: Urine, Clean Catch Updated: 08/08/23 2121     Color, UA Light Yellow     Clarity, UA Clear     Specific Gravity, UA 1.013     pH, UA 5.5     Leukocytes, UA Negative     Nitrite, UA Negative     Protein, UA Negative mg/dl      Glucose, UA Negative mg/dl      Ketones, UA Negative mg/dl      Urobilinogen, UA <2.0 mg/dl      Bilirubin, UA Negative     Occult Blood, UA Negative    Chlamydia/GC amplified DNA by PCR [[id number]] Collected: 08/08/23 2051 Lab Status: In process Specimen: Urine, Other Updated: 08/08/23 2057    POCT pregnancy, urine [[id number]]  (Normal) Resulted: 08/08/23 2053    Lab Status: Final result Updated: 08/08/23 2053     EXT Preg Test, Ur Negative     Control Valid    CBC and Platelet [[id number]]  (Abnormal) Collected: 08/08/23 2036    Lab Status: Final result Specimen: Blood from Arm, Left Updated: 08/08/23 2052     WBC 7.33 Thousand/uL      RBC 4.87 Million/uL      Hemoglobin 12.2 g/dL      Hematocrit 38.2 %      MCV 78 fL      MCH 25.1 pg      MCHC 31.9 g/dL      RDW 17.0 %      Platelets 105 Thousands/uL      MPV 10.4 fL                  No orders to display         Procedures  Procedures      ED Course  ED Course as of 08/09/23 0035   Tue Aug 08, 2023   214 25-year-old female with history of 2 prior ectopic pregnancies presenting on mild vaginal bleeding and abdominal cramping for the past 4 days. Last menstrual period 7 days ago. Patient managing pain well with ibuprofen at this time. On exam patient is nontoxic and afebrile, she has focal lower abdominal tenderness to palpation. vaginal exam performed at the bedside with normal external and internal genitalia exam, and no evidence of vaginal discharge above the acute bleeding or lacerations noted. Will assess with urine studies, GC chlamydia, pregnancy urine and blood clinical structures   2054 Hemoglobin: 12.2  WNL. 2103 PREGNANCY TEST URINE: Negative   2121 UA w Reflex to Microscopic w Reflex to Culture  Urine without infection or microalbuminuria. 2129 Patient stable for discharge at this time, workup essentially negative. She was advised to follow-up with OB/GYN and provided information for [facility]. Reviewed strict return precautions with patient and she is agreeable to discharge home.                Medical Decision Making  Patient with history as above presented with Patient presents with:  Vaginal Bleeding: Pt reports long menstruation cycles, had it first week of this month and reports still bleeding, denies heavy bleeding, lower abd cramping  . Hx obtained from pt    Patient was nontoxic, stable. Ambulatory. Exam as above. 25 y/o female with history of ectopic pregnancy presenting with 4 days of mild vaginal bleeding and cramping lower abdominal pain. LNMP aug 1 2023. Pt states that her period has persisted 4 days past her normal with intermittent spotting with mild lightheadedness. She is not currently on any hormonal birth control. Denies injury, trauma or FB. On exam pt is afebrile and non-toxic, with mild lower abd TTP. Pelvic exam revealed normal external genitalia, with no overlying rash, discharge, active bleeding/clots, or lacerations; the vagina was normal as well w/o evidence of discharge, trauma/laceration or active bleeding, cervical os was closed. Hgb stable at 12.2, UA without sign of infection and non hematuria. UA preg neg. Pt otherwise stable for dc. She was advised to take NSAIDs for pain relief. Pt was advised to f/u with OB/gyn. Reviewed strict return precautions and pt agreeable with dc plan. I have independently ordered, reviewed and interpreted the following lab and/or imaging studies listed above. Reviewed external records including notes, and prior labs/imaging results. Differential diagnosis considered including but not limited to DDx including but not limited to: DUB, UTI, STD, anemia, coagulopathy, DUB, low suspicion for ectopic pregnancy, threatened , missed , incomplete , tumor, retained products of conception, PCOS; doubt ovarian torsion or ruptured ovarian cyst. Overall presentation is consistent with DUB    Consideration was given for admission, but the patient was stable for outpatient management. Disposition: Discussed need to follow up diagnostics, including incidental findings.  Discharged with instructions to obtain outpatient follow up of patient's symptoms and findings, with strict return precautions if patient develops new or worsening symptoms. Amount and/or Complexity of Data Reviewed  Labs: ordered. Decision-making details documented in ED Course. Disposition  Final diagnoses:   Vaginal bleeding   Dysfunctional uterine bleeding     Time reflects when diagnosis was documented in both MDM as applicable and the Disposition within this note     Time User Action Codes Description Comment    8/8/2023  9:12 PM [facility] Add [N93.9] Vaginal bleeding     8/9/2023 12:35 AM [facility] Add [N93.8] Dysfunctional uterine bleeding     8/9/2023 12:35 AM [facility] Modify [N93.9] Vaginal bleeding     8/9/2023 12:35 AM [facility] Modify [N93.8] Dysfunctional uterine bleeding       ED Disposition     ED Disposition   Discharge    Condition   Stable    Date/Time   Tue Aug 8, 2023  9:22 PM    Comment   [address] discharge to home/self care.                Follow-up Information     Follow up With Specialties Details Why Contact Info Additional [facility] Call today Please call tomorrow to schedule an appointment [address] [place] [address] [address], [address] [place], [place], [address] [facility]          Discharge Medication List as of 8/8/2023  9:22 PM      CONTINUE these medications which have NOT CHANGED    Details   docusate sodium (COLACE) 100 mg capsule Take 1 capsule (100 mg total) by mouth daily for 14 days, Starting Thu 1/12/2023, Until Thu 1/26/2023, Normal      isoniazid (NYDRAZID) 300 mg tablet Take 3 tablets (900 mg total) by mouth once a week for 12 doses, Starting Fri 12/23/2022, Until Sat 3/11/2023, Normal      Priftin 150 MG TABS TAKE 6 TABLETS (900 MG TOTAL) BY MOUTH ONCE A WEEK FOR 12 DOSES PLEASE TAKE MEDICATION WEEKLY FOR 3 MONTHS, Normal      pyridoxine (VITAMIN B6) 50 mg tablet TAKE 1 TABLET (50 MG TOTAL) BY MOUTH ONCE A WEEK FOR 12 DAYS, Normal           No discharge procedures on file. PDMP Review     None           ED Provider  Attending physically available and evaluated [facility]. I managed the patient along with the ED Attending.     Electronically Signed by         Franklin Enciso DO  08/09/23 8442

## 2023-08-09 LAB
C TRACH DNA SPEC QL NAA+PROBE: POSITIVE
N GONORRHOEA DNA SPEC QL NAA+PROBE: NEGATIVE

## 2023-08-09 NOTE — DISCHARGE INSTRUCTIONS
Today you were seen in the emergency department for vaginal bleeding. Your workup included pelvic exam, urine studies and blood work. At this time there does not appear to be an emergent life threatening cause to explain your symptoms. You are stable for discharge. Please follow up with your primary care provider or OB/Gyn in the next 2-3 days. Please review all results discussed today with your primary care provider. You may take ibuprofen 400mg every 6-8hrs as needed for pain for the next 2 days. Please return to the emergency department as soon as possible if you develop passing out, profuse vaginal bleeding, uncontrollable fevers (Temp >100.4), uncontrollable pain, vomiting, chest pain, trouble breathing, or any other concerning symptoms. Thank you for choosing [facility] for your care.

## 2023-08-09 NOTE — ED ATTENDING ATTESTATION
8/8/2023  IHollis DO, saw and evaluated the patient. I have discussed the patient with the resident/non-physician practitioner and agree with the resident's/non-physician practitioner's findings, Plan of Care, and MDM as documented in the resident's/non-physician practitioner's note, except where noted. All available labs and Radiology studies were reviewed. I was present for key portions of any procedure(s) performed by the resident/non-physician practitioner and I was immediately available to provide assistance. At this point I agree with the current assessment done in the Emergency Department. I have conducted an independent evaluation of this patient a history and physical is as follows:                68-year-old female, presents with vaginal bleeding.,  Her period was at the beginning of this month which came in a normal time but she has had continued bleeding until now which is regular for her.,  Occasionally feels dizzy when she stands up. Admits that she is anxious. History of 2 ectopic pregnancies in the past.  Does not believe that she is pregnant. No falls no injury no trauma. No associated abdominal pain. Review of Systems   Constitutional: Negative for activity change, chills, diaphoresis and fever. HENT: Negative for congestion, sinus pressure and sore throat. Eyes: Negative for pain and visual disturbance. Respiratory: Negative for cough, chest tightness, shortness of breath, wheezing and stridor. Cardiovascular: Negative for chest pain and palpitations. Gastrointestinal: Negative for abdominal distention, abdominal pain, constipation, diarrhea, nausea and vomiting. Genitourinary: Negative for dysuria and frequency. Musculoskeletal: Negative for neck pain and neck stiffness. Skin: Negative for rash. Neurological: Negative for dizziness, speech difficulty, light-headedness, numbness and headaches. Physical Exam  Vitals reviewed. Constitutional:       General: She is not in acute distress. Appearance: She is well-developed. She is not diaphoretic. HENT:      Head: Normocephalic and atraumatic. Right Ear: External ear normal.      Left Ear: External ear normal.      Nose: Nose normal.   Eyes:      General:         Right eye: No discharge. Left eye: No discharge. Pupils: Pupils are equal, round, and reactive to light. Neck:      Trachea: No tracheal deviation. Cardiovascular:      Rate and Rhythm: Normal rate and regular rhythm. Heart sounds: Normal heart sounds. No murmur heard. Pulmonary:      Effort: Pulmonary effort is normal. No respiratory distress. Breath sounds: Normal breath sounds. No stridor. Abdominal:      General: There is no distension. Palpations: Abdomen is soft. Tenderness: There is no abdominal tenderness. There is no guarding or rebound. Genitourinary:     Comments: Pelvic exam: See resident note  Musculoskeletal:         General: Normal range of motion. Cervical back: Normal range of motion and neck supple. Skin:     General: Skin is warm and dry. Coloration: Skin is not pale. Findings: No erythema. Neurological:      General: No focal deficit present. Mental Status: She is alert and oriented to person, place, and time.                     Labs Reviewed   CBC AND PLATELET - Abnormal       Result Value Ref Range Status    WBC 7.33  4.31 - 10.16 Thousand/uL Final    RBC 4.87  3.81 - 5.12 Million/uL Final    Hemoglobin 12.2  11.5 - 15.4 g/dL Final    Hematocrit 38.2  34.8 - 46.1 % Final    MCV 78 (*) 82 - 98 fL Final    MCH 25.1 (*) 26.8 - 34.3 pg Final    MCHC 31.9  31.4 - 37.4 g/dL Final    RDW 17.0 (*) 11.6 - 15.1 % Final    Platelets 547  253 - 390 Thousands/uL Final    MPV 10.4  8.9 - 12.7 fL Final   POCT PREGNANCY, URINE - Normal    EXT Preg Test, Ur Negative   Final    Control Valid   Final   CHLAMYDIA /GC AMPLIFIED DNA   UA W REFLEX TO MICROSCOPIC WITH REFLEX TO CULTURE    Color, UA Light Yellow   Final    Clarity, UA Clear   Final    Specific Gravity, UA 1.013  1.003 - 1.030 Final    pH, UA 5.5  4.5, 5.0, 5.5, 6.0, 6.5, 7.0, 7.5, 8.0 Final    Leukocytes, UA Negative  Negative Final    Nitrite, UA Negative  Negative Final    Protein, UA Negative  Negative mg/dl Final    Glucose, UA Negative  Negative mg/dl Final    Ketones, UA Negative  Negative mg/dl Final    Urobilinogen, UA <2.0  <2.0 mg/dl mg/dl Final    Bilirubin, UA Negative  Negative Final    Occult Blood, UA Negative  Negative Final               No orders to display                             ED Course         Critical Care Time  Procedures          MDM  Number of Diagnoses or Management Options  Diagnosis management comments:       Initial ED assessment:     39-year-old female, presents with vaginal bleeding, had normal menses earlier this month but has had continued bleeding.,  Light, but having some dizziness upon standing    Initial DDx includes but is not limited to:   Mental menorrhagia, pregnancy, ectopic pregnancy, miscarriage, anemia. No abdominal pain so doubt intra-abdominal pathology.   No history suggestive of STDs has been checked in the past which has been negative reviewing old records    Initial ED plan:   Blood work urinalysis pregnancy test        Final ED summary/disposition:   After evaluation and workup in the emergency department, pregnancy test negative, not anemic, no evidence urinary tract infection, advised patient to take NSAIDs at home, will follow-up outpatient OB                   Time reflects when diagnosis was documented in both MDM as applicable and the Disposition within this note     Time User Action Codes Description Comment    8/8/2023  9:12 PM Melissa Hansen Add [N93.9] Vaginal bleeding       ED Disposition     ED Disposition   Discharge    Condition   Stable    Date/Time   Tue Aug 8, 2023  9:22 PM    [address] discharge to home/self care.               Follow-up Information     Follow up With Specialties Details Why [facility] Call today Please call tomorrow to schedule an appointment [address] [facility], [address] [place], [place], [address] [place] [address]

## 2023-08-17 ENCOUNTER — HOSPITAL ENCOUNTER (EMERGENCY)
Facility: HOSPITAL | Age: 33
Discharge: HOME | End: 2023-08-17
Attending: EMERGENCY MEDICINE | Admitting: EMERGENCY MEDICINE
Payer: COMMERCIAL

## 2023-08-17 VITALS
HEART RATE: 88 BPM | TEMPERATURE: 98.6 F | BODY MASS INDEX: 36.39 KG/M2 | RESPIRATION RATE: 16 BRPM | OXYGEN SATURATION: 100 % | SYSTOLIC BLOOD PRESSURE: 128 MMHG | WEIGHT: 212 LBS | DIASTOLIC BLOOD PRESSURE: 81 MMHG

## 2023-08-17 DIAGNOSIS — Z20.822 EXPOSURE TO CONFIRMED CASE OF COVID-19: Primary | ICD-10-CM

## 2023-08-17 LAB
FLUAV RNA RESP QL NAA+PROBE: NEGATIVE
FLUBV RNA RESP QL NAA+PROBE: NEGATIVE
RSV RNA RESP QL NAA+PROBE: NEGATIVE
SARS-COV-2 RNA RESP QL NAA+PROBE: POSITIVE

## 2023-08-17 PROCEDURE — 99283 EMERGENCY DEPT VISIT LOW MDM: CPT

## 2023-08-17 PROCEDURE — 0241U HB NFCT DS VIR RESP RNA 4 TRGT: CPT

## 2023-08-17 PROCEDURE — 99284 EMERGENCY DEPT VISIT MOD MDM: CPT | Performed by: EMERGENCY MEDICINE

## 2023-08-17 NOTE — Clinical Note
Argelia Wisdom was seen and treated in our emergency department on 8/17/2023. Diagnosis:     [address]  . She may return on this date:     Was seen in emergency Dept for Covid 19 testing. If you have any questions or concerns, please don't hesitate to call.       Jose Diggs MD    ______________________________           _______________          _______________  [facility] Representative                              Date                                Time

## 2023-08-18 NOTE — ED ATTENDING ATTESTATION
8/17/2023  IJennifer MD, saw and evaluated the patient. I have discussed the patient with the resident/non-physician practitioner and agree with the resident's/non-physician practitioner's findings, Plan of Care, and MDM as documented in the resident's/non-physician practitioner's note, except where noted. All available labs and Radiology studies were reviewed. I was present for key portions of any procedure(s) performed by the resident/non-physician practitioner and I was immediately available to provide assistance. At this point I agree with the current assessment done in the Emergency Department. I have conducted an independent evaluation of this patient a history and physical is as follows:    25 y/o female tested positive for covid at home. Runny nose and fatigue. Needs official covid test for work. Has no other complaints. No chest pain. No short of breath. No fevers or chills. No vomiting. No lightheadedness or dizziness.     covid test, outpatient follow-up      ED Course         Critical Care Time  Procedures

## 2023-08-18 NOTE — ED PROVIDER NOTES
History  Chief Complaint   Patient presents with   • COVID-19 Exposure     Pt states that she took 2 covid tests and they are positive. Pt states that she wants confirmation. The patient is a 26year old female who presents to ED after [facility] for a confirmatory test. 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 She has been working with the confirmed patient for the past 2 weeks. She has been having some rhinorrhea and fatigue for 2 days. She was given a home covid test at her job, which was positive and was then sent by her employer to get a confirmatory test. Denies SOB, chest pain, cough, dizziness, fevers, chills, headaches. She is not interested in antiviral medication at this time. Prior to Admission Medications   Prescriptions Last Dose Informant Patient Reported? Taking? Priftin 150 MG TABS   No No   Sig: TAKE 6 TABLETS (900 MG TOTAL) BY MOUTH ONCE A WEEK FOR 12 DOSES PLEASE TAKE MEDICATION WEEKLY FOR 3 MONTHS   docusate sodium (COLACE) 100 mg capsule   No No   Sig: Take 1 capsule (100 mg total) by mouth daily for 14 days   isoniazid (NYDRAZID) 300 mg tablet   No No   Sig: Take 3 tablets (900 mg total) by mouth once a week for 12 doses   pyridoxine (VITAMIN B6) 50 mg tablet   No No   Sig: TAKE 1 TABLET (50 MG TOTAL) BY MOUTH ONCE A WEEK FOR 12 DAYS      Facility-Administered Medications: None       Past Medical History:   Diagnosis Date   • Ectopic pregnancy 2013 and 2016     patient thinks she had a salpingectomy on one side and a salpingostomy on the other side        Past Surgical History:   Procedure Laterality Date   • SALPINGECTOMY Bilateral        Family History   Problem Relation Age of Onset   • Ovarian cancer Sister      I have reviewed and agree with the history as documented.     E-Cigarette/Vaping   • E-Cigarette Use Never User      E-Cigarette/Vaping Substances     Social History     Tobacco Use   • Smoking status: Never   • Smokeless tobacco: Never   Vaping Use   • Vaping Use: Never used   Substance Use Topics   • Alcohol use: Never   • Drug use: Never        Review of Systems   Constitutional: Positive for fatigue. Negative for fever. HENT: Positive for rhinorrhea. Negative for sore throat. Respiratory: Negative for cough and shortness of breath. Cardiovascular: Negative for chest pain and leg swelling. Gastrointestinal: Negative for abdominal pain, diarrhea and vomiting. Genitourinary: Negative for difficulty urinating and dysuria. Musculoskeletal: Negative for back pain and myalgias. Skin: Negative for rash. Neurological: Negative for dizziness and headaches. Physical Exam  ED Triage Vitals   Temperature Pulse Respirations Blood Pressure SpO2   08/17/23 2023 08/17/23 2023 08/17/23 2023 08/17/23 2023 08/17/23 2023   98.6 °F (37 °C) 87 16 128/81 100 %      Temp Source Heart Rate Source Patient Position - Orthostatic VS BP Location FiO2 (%)   08/17/23 2023 08/17/23 2045 08/17/23 2023 08/17/23 2023 --   Oral Monitor Lying Right arm       Pain Score       08/17/23 2023       No Pain             Orthostatic Vital Signs  Vitals:    08/17/23 2023 08/17/23 2045   BP: 128/81    Pulse: 87 88   Patient Position - Orthostatic VS: Lying        Physical Exam  Vitals and nursing note reviewed. Constitutional:       General: She is not in acute distress. Appearance: Normal appearance. She is not ill-appearing. HENT:      Head: Normocephalic and atraumatic. Cardiovascular:      Rate and Rhythm: Normal rate and regular rhythm. Pulses: Normal pulses. Heart sounds: Normal heart sounds. Pulmonary:      Effort: Pulmonary effort is normal.      Breath sounds: Normal breath sounds. No wheezing or rales. Abdominal:      General: Abdomen is flat. Palpations: Abdomen is soft. Tenderness: There is no abdominal tenderness. Musculoskeletal:         General: No deformity.  Normal range of motion. Skin:     General: Skin is warm and dry. Neurological:      General: No focal deficit present. Mental Status: She is alert and oriented to person, place, and time. Psychiatric:         Mood and Affect: Mood normal.         Behavior: Behavior normal.         ED Medications  Medications - No data to display    Diagnostic Studies  Results Reviewed     Procedure Component Value Units Date/Time    FLU/RSV/COVID - if FLU/RSV clinically relevant [[id number]] Collected: 08/17/23 2045    Lab Status: In process Specimen: Nares from Nose Updated: 08/17/23 2051                 No orders to display         Procedures  Procedures      ED Course                                       Medical Decision Making  DDx: Covid 23, Flu, viral illness, URI    The patient is here at the request of her employer to get a confirmatory Covid 19 test after exposure to a confirmed case. Mild symptoms and the patient is not interested in any further workup or treatment, just wanting to have a confirmatory test. The patient was discharged after collection of the sample swab and informed she may check her results in [facility], informed of indications to return to the ED, which she understood and was agreeable to. Disposition  Final diagnoses:   Exposure to confirmed case of COVID-19     Time reflects when diagnosis was documented in both MDM as applicable and the Disposition within this note     Time User Action Codes Description Comment    8/17/2023  8:47 PM Melissa Yoni Orosco [Z20.822] Exposure to confirmed case of COVID-19       ED Disposition     ED Disposition   Discharge    Condition   Stable    Date/Time   Thu Aug 17, 2023  8:46 PM    [address] discharge to home/self care.                Follow-up Information     Follow up With Specialties Details Why Contact Info    [facility]    [address]  [phone]          Discharge Medication List as of 8/17/2023 8:48 PM      CONTINUE these medications which have NOT CHANGED    Details   docusate sodium (COLACE) 100 mg capsule Take 1 capsule (100 mg total) by mouth daily for 14 days, Starting Thu 1/12/2023, Until Thu 1/26/2023, Normal      isoniazid (NYDRAZID) 300 mg tablet Take 3 tablets (900 mg total) by mouth once a week for 12 doses, Starting Fri 12/23/2022, Until Sat 3/11/2023, Normal      Priftin 150 MG TABS TAKE 6 TABLETS (900 MG TOTAL) BY MOUTH ONCE A WEEK FOR 12 DOSES PLEASE TAKE MEDICATION WEEKLY FOR 3 MONTHS, Normal      pyridoxine (VITAMIN B6) 50 mg tablet TAKE 1 TABLET (50 MG TOTAL) BY MOUTH ONCE A WEEK FOR 12 DAYS, Normal           No discharge procedures on file. PDMP Review     None           ED Provider  Attending physically available and evaluated [facility]. I managed the patient along with the ED Attending.     Electronically Signed by         Aditya Neal MD  08/17/23 [id number]

## 2024-01-25 ENCOUNTER — TELEPHONE (OUTPATIENT)
Dept: FAMILY MEDICINE CLINIC | Facility: CLINIC | Age: 34
End: 2024-01-25